# Patient Record
Sex: MALE | Race: WHITE | Employment: OTHER | URBAN - METROPOLITAN AREA
[De-identification: names, ages, dates, MRNs, and addresses within clinical notes are randomized per-mention and may not be internally consistent; named-entity substitution may affect disease eponyms.]

---

## 2017-04-04 ENCOUNTER — OFFICE VISIT (OUTPATIENT)
Dept: INTERNAL MEDICINE CLINIC | Facility: CLINIC | Age: 62
End: 2017-04-04

## 2017-04-04 VITALS
SYSTOLIC BLOOD PRESSURE: 150 MMHG | WEIGHT: 295 LBS | RESPIRATION RATE: 14 BRPM | BODY MASS INDEX: 40 KG/M2 | HEART RATE: 87 BPM | TEMPERATURE: 98 F | OXYGEN SATURATION: 98 % | DIASTOLIC BLOOD PRESSURE: 92 MMHG

## 2017-04-04 DIAGNOSIS — R73.9 ELEVATED BLOOD SUGAR: ICD-10-CM

## 2017-04-04 DIAGNOSIS — R03.0 ELEVATED BLOOD PRESSURE READING: Primary | ICD-10-CM

## 2017-04-04 DIAGNOSIS — E66.3 OVERWEIGHT(278.02): ICD-10-CM

## 2017-04-04 PROCEDURE — 99214 OFFICE O/P EST MOD 30 MIN: CPT | Performed by: INTERNAL MEDICINE

## 2017-04-04 PROCEDURE — 99212 OFFICE O/P EST SF 10 MIN: CPT | Performed by: INTERNAL MEDICINE

## 2017-04-04 NOTE — PROGRESS NOTES
Dominguez Christian is a 58year old male   HPI:   Pt.presents for the following problems. Patient feels well. No acute complaints. Discussed weight. Patient having trouble losing weight. Encouraged weight loss. Increasing activity.   I did discuss opt sore throat  LUNGS:  denies shortness of breath or cough  CARDIOVASCULAR :  denies chest pain or palpitations  GI:  denies abdominal pain, blood in stool or changes in bowel movements.   : denies blood in urine or changes in stream  NEURO:  denies headach

## 2017-05-11 ENCOUNTER — TELEPHONE (OUTPATIENT)
Dept: INTERNAL MEDICINE CLINIC | Facility: CLINIC | Age: 62
End: 2017-05-11

## 2017-05-11 NOTE — TELEPHONE ENCOUNTER
Pt is filling forms for life insurance. He needs his cholesterol readings -total cholesterol, cholesterol profile, HDL , total HDL ratio. (he was reading off of the form).     Tasked to [place]

## 2017-06-29 ENCOUNTER — APPOINTMENT (OUTPATIENT)
Dept: LAB | Age: 62
End: 2017-06-29
Attending: INTERNAL MEDICINE
Payer: COMMERCIAL

## 2017-06-29 DIAGNOSIS — R03.0 ELEVATED BLOOD PRESSURE READING: ICD-10-CM

## 2017-06-29 DIAGNOSIS — R73.9 ELEVATED BLOOD SUGAR: ICD-10-CM

## 2017-06-29 DIAGNOSIS — E66.3 OVERWEIGHT: ICD-10-CM

## 2017-06-29 LAB
ALBUMIN SERPL BCP-MCNC: 3.7 G/DL (ref 3.5–4.8)
ALBUMIN/GLOB SERPL: 1.2 {RATIO} (ref 1–2)
ALP SERPL-CCNC: 44 U/L (ref 32–100)
ALT SERPL-CCNC: 20 U/L (ref 17–63)
ANION GAP SERPL CALC-SCNC: 10 MMOL/L (ref 0–18)
AST SERPL-CCNC: 12 U/L (ref 15–41)
BILIRUB SERPL-MCNC: 1.3 MG/DL (ref 0.3–1.2)
BILIRUB UR QL: NEGATIVE
BUN SERPL-MCNC: 26 MG/DL (ref 8–20)
BUN/CREAT SERPL: 21 (ref 10–20)
CALCIUM SERPL-MCNC: 8.7 MG/DL (ref 8.5–10.5)
CHLORIDE SERPL-SCNC: 106 MMOL/L (ref 95–110)
CHOLEST SERPL-MCNC: 168 MG/DL (ref 110–200)
CLARITY UR: CLEAR
CO2 SERPL-SCNC: 24 MMOL/L (ref 22–32)
COLOR UR: YELLOW
CREAT SERPL-MCNC: 1.24 MG/DL (ref 0.5–1.5)
GLOBULIN PLAS-MCNC: 3 G/DL (ref 2.5–3.7)
GLUCOSE SERPL-MCNC: 104 MG/DL (ref 70–99)
GLUCOSE UR-MCNC: NEGATIVE MG/DL
HBA1C MFR BLD: 5.9 % (ref 4–6)
HDLC SERPL-MCNC: 44 MG/DL
HGB UR QL STRIP.AUTO: NEGATIVE
KETONES UR-MCNC: NEGATIVE MG/DL
LDLC SERPL CALC-MCNC: 115 MG/DL (ref 0–99)
LEUKOCYTE ESTERASE UR QL STRIP.AUTO: NEGATIVE
NITRITE UR QL STRIP.AUTO: NEGATIVE
NONHDLC SERPL-MCNC: 124 MG/DL
OSMOLALITY UR CALC.SUM OF ELEC: 295 MOSM/KG (ref 275–295)
PH UR: 5 [PH] (ref 5–8)
POTASSIUM SERPL-SCNC: 4.1 MMOL/L (ref 3.3–5.1)
PROT SERPL-MCNC: 6.7 G/DL (ref 5.9–8.4)
PROT UR-MCNC: NEGATIVE MG/DL
SODIUM SERPL-SCNC: 140 MMOL/L (ref 136–144)
SP GR UR STRIP: 1.02 (ref 1–1.03)
TRIGL SERPL-MCNC: 46 MG/DL (ref 1–149)
UROBILINOGEN UR STRIP-ACNC: <2
VIT C UR-MCNC: NEGATIVE MG/DL

## 2017-06-29 PROCEDURE — 80053 COMPREHEN METABOLIC PANEL: CPT

## 2017-06-29 PROCEDURE — 80061 LIPID PANEL: CPT

## 2017-06-29 PROCEDURE — 83036 HEMOGLOBIN GLYCOSYLATED A1C: CPT

## 2017-06-29 PROCEDURE — 36415 COLL VENOUS BLD VENIPUNCTURE: CPT

## 2017-06-29 PROCEDURE — 81003 URINALYSIS AUTO W/O SCOPE: CPT

## 2017-07-13 ENCOUNTER — OFFICE VISIT (OUTPATIENT)
Dept: INTERNAL MEDICINE CLINIC | Facility: CLINIC | Age: 62
End: 2017-07-13

## 2017-07-13 VITALS
WEIGHT: 286 LBS | TEMPERATURE: 98 F | DIASTOLIC BLOOD PRESSURE: 86 MMHG | HEART RATE: 81 BPM | BODY MASS INDEX: 39 KG/M2 | SYSTOLIC BLOOD PRESSURE: 134 MMHG | OXYGEN SATURATION: 98 %

## 2017-07-13 DIAGNOSIS — R73.9 ELEVATED BLOOD SUGAR: ICD-10-CM

## 2017-07-13 DIAGNOSIS — E66.3 OVERWEIGHT: ICD-10-CM

## 2017-07-13 DIAGNOSIS — R03.0 ELEVATED BLOOD PRESSURE READING: Primary | ICD-10-CM

## 2017-07-13 DIAGNOSIS — Z00.00 ROUTINE HEALTH MAINTENANCE: ICD-10-CM

## 2017-07-13 PROCEDURE — 99212 OFFICE O/P EST SF 10 MIN: CPT | Performed by: INTERNAL MEDICINE

## 2017-07-13 PROCEDURE — 99214 OFFICE O/P EST MOD 30 MIN: CPT | Performed by: INTERNAL MEDICINE

## 2017-07-13 NOTE — PROGRESS NOTES
Sandra Billingsley is a 58year old male   HPI:   Pt.presents for the following problems. Patient here for blood pressure follow-up. Blood pressure much better. Patient did lose weight. He has cut out carbohydrates.   Weight April 4 was 295 pounds and now blurred vision or eye pain  HEENT: denies nasal congestion, sinus pain or sore throat  LUNGS:  denies shortness of breath or cough  CARDIOVASCULAR :  denies chest pain or palpitations  GI:  denies abdominal pain, blood in stool or changes in bowel movement

## 2017-10-12 ENCOUNTER — OFFICE VISIT (OUTPATIENT)
Dept: INTERNAL MEDICINE CLINIC | Facility: CLINIC | Age: 62
End: 2017-10-12

## 2017-10-12 VITALS
SYSTOLIC BLOOD PRESSURE: 130 MMHG | HEIGHT: 72 IN | WEIGHT: 275.81 LBS | TEMPERATURE: 98 F | BODY MASS INDEX: 37.36 KG/M2 | HEART RATE: 76 BPM | DIASTOLIC BLOOD PRESSURE: 78 MMHG | OXYGEN SATURATION: 98 %

## 2017-10-12 DIAGNOSIS — E66.3 OVERWEIGHT: ICD-10-CM

## 2017-10-12 DIAGNOSIS — Z12.11 COLON CANCER SCREENING: ICD-10-CM

## 2017-10-12 DIAGNOSIS — R73.9 ELEVATED BLOOD SUGAR: ICD-10-CM

## 2017-10-12 DIAGNOSIS — R03.0 ELEVATED BLOOD PRESSURE READING: Primary | ICD-10-CM

## 2017-10-12 PROCEDURE — 99213 OFFICE O/P EST LOW 20 MIN: CPT | Performed by: INTERNAL MEDICINE

## 2017-10-12 PROCEDURE — 90686 IIV4 VACC NO PRSV 0.5 ML IM: CPT | Performed by: INTERNAL MEDICINE

## 2017-10-12 PROCEDURE — 90471 IMMUNIZATION ADMIN: CPT | Performed by: INTERNAL MEDICINE

## 2017-10-12 PROCEDURE — 99212 OFFICE O/P EST SF 10 MIN: CPT | Performed by: INTERNAL MEDICINE

## 2017-10-12 NOTE — PROGRESS NOTES
Irena Talbert is a 58year old male   HPI:   Pt.presents for the following problems. Patient feels well. Has no complaints. He has lost a nice amount of weight. His current weight is 275. In April he was 295. He has cut out breads. Pastries.   Sna stream  MUSCULOSKELETAL:  denies back pain or joint pain  NEURO:  denies headaches or dizzyness  PSYCHE:  denies depression or anxiety    EXAM:   /78 (BP Location: Right arm, Patient Position: Sitting, Cuff Size: large)   Pulse 76   Temp 98.2 °F (36.

## 2018-01-08 ENCOUNTER — APPOINTMENT (OUTPATIENT)
Dept: LAB | Age: 63
End: 2018-01-08
Attending: INTERNAL MEDICINE
Payer: COMMERCIAL

## 2018-01-08 DIAGNOSIS — R73.9 ELEVATED BLOOD SUGAR: ICD-10-CM

## 2018-01-08 LAB
ALBUMIN SERPL BCP-MCNC: 3.8 G/DL (ref 3.5–4.8)
ALBUMIN/GLOB SERPL: 1.3 {RATIO} (ref 1–2)
ALP SERPL-CCNC: 42 U/L (ref 32–100)
ALT SERPL-CCNC: 19 U/L (ref 17–63)
ANION GAP SERPL CALC-SCNC: 6 MMOL/L (ref 0–18)
AST SERPL-CCNC: 13 U/L (ref 15–41)
BILIRUB SERPL-MCNC: 0.9 MG/DL (ref 0.3–1.2)
BUN SERPL-MCNC: 18 MG/DL (ref 8–20)
BUN/CREAT SERPL: 18.8 (ref 10–20)
CALCIUM SERPL-MCNC: 8.9 MG/DL (ref 8.5–10.5)
CHLORIDE SERPL-SCNC: 106 MMOL/L (ref 95–110)
CHOLEST SERPL-MCNC: 185 MG/DL (ref 110–200)
CO2 SERPL-SCNC: 27 MMOL/L (ref 22–32)
CREAT SERPL-MCNC: 0.96 MG/DL (ref 0.5–1.5)
GLOBULIN PLAS-MCNC: 2.9 G/DL (ref 2.5–3.7)
GLUCOSE SERPL-MCNC: 106 MG/DL (ref 70–99)
HDLC SERPL-MCNC: 50 MG/DL
LDLC SERPL CALC-MCNC: 125 MG/DL (ref 0–99)
NONHDLC SERPL-MCNC: 135 MG/DL
OSMOLALITY UR CALC.SUM OF ELEC: 290 MOSM/KG (ref 275–295)
POTASSIUM SERPL-SCNC: 4.3 MMOL/L (ref 3.3–5.1)
PROT SERPL-MCNC: 6.7 G/DL (ref 5.9–8.4)
PSA SERPL-MCNC: 2.6 NG/ML (ref 0–4)
SODIUM SERPL-SCNC: 139 MMOL/L (ref 136–144)
TRIGL SERPL-MCNC: 49 MG/DL (ref 1–149)

## 2018-01-08 PROCEDURE — 36415 COLL VENOUS BLD VENIPUNCTURE: CPT

## 2018-01-08 PROCEDURE — 80053 COMPREHEN METABOLIC PANEL: CPT

## 2018-01-08 PROCEDURE — 80061 LIPID PANEL: CPT

## 2018-01-11 ENCOUNTER — OFFICE VISIT (OUTPATIENT)
Dept: INTERNAL MEDICINE CLINIC | Facility: CLINIC | Age: 63
End: 2018-01-11

## 2018-01-11 VITALS
SYSTOLIC BLOOD PRESSURE: 130 MMHG | WEIGHT: 271 LBS | DIASTOLIC BLOOD PRESSURE: 78 MMHG | TEMPERATURE: 99 F | HEIGHT: 72 IN | HEART RATE: 72 BPM | BODY MASS INDEX: 36.7 KG/M2

## 2018-01-11 DIAGNOSIS — E66.3 OVERWEIGHT: ICD-10-CM

## 2018-01-11 DIAGNOSIS — Z12.11 COLON CANCER SCREENING: ICD-10-CM

## 2018-01-11 DIAGNOSIS — R03.0 ELEVATED BLOOD PRESSURE READING: Primary | ICD-10-CM

## 2018-01-11 DIAGNOSIS — Z00.00 ROUTINE HEALTH MAINTENANCE: ICD-10-CM

## 2018-01-11 DIAGNOSIS — R73.9 ELEVATED BLOOD SUGAR: ICD-10-CM

## 2018-01-11 PROCEDURE — 99212 OFFICE O/P EST SF 10 MIN: CPT | Performed by: INTERNAL MEDICINE

## 2018-01-11 PROCEDURE — 99214 OFFICE O/P EST MOD 30 MIN: CPT | Performed by: INTERNAL MEDICINE

## 2018-01-11 NOTE — PROGRESS NOTES
Dominguez Christian is a 58year old male   HPI:   Pt.presents for the following problems. Patient has done a very good job losing weight. In April 2017 he was 295 pounds. He is now 5. That is a 24 pound weight loss.     Blood pressure no more satisfacto History:  Smoking status: Never Smoker                                                              Smokeless tobacco: Never Used                      Alcohol use: No                    REVIEW OF SYSTEMS:   GENERAL:  feels well. No acute distress.   SKIN: maintenance  Patient did have his flu vaccine this season. Follow-up in 6 months. Repeat PSA at that time including other general labs.     Eve Terry MD  1/11/2018  5:55 PM

## 2018-07-06 ENCOUNTER — TELEPHONE (OUTPATIENT)
Dept: INTERNAL MEDICINE CLINIC | Facility: CLINIC | Age: 63
End: 2018-07-06

## 2018-07-06 DIAGNOSIS — Z11.59 ENCOUNTER FOR HEPATITIS C SCREENING TEST FOR LOW RISK PATIENT: ICD-10-CM

## 2018-07-06 DIAGNOSIS — R73.9 ELEVATED BLOOD SUGAR: Primary | ICD-10-CM

## 2018-07-10 ENCOUNTER — APPOINTMENT (OUTPATIENT)
Dept: LAB | Age: 63
End: 2018-07-10
Attending: INTERNAL MEDICINE
Payer: COMMERCIAL

## 2018-07-10 DIAGNOSIS — Z11.59 ENCOUNTER FOR HEPATITIS C SCREENING TEST FOR LOW RISK PATIENT: ICD-10-CM

## 2018-07-10 DIAGNOSIS — R73.9 ELEVATED BLOOD SUGAR: ICD-10-CM

## 2018-07-10 LAB
ANION GAP SERPL CALC-SCNC: 9 MMOL/L (ref 0–18)
BUN SERPL-MCNC: 22 MG/DL (ref 8–20)
BUN/CREAT SERPL: 23.7 (ref 10–20)
CALCIUM SERPL-MCNC: 8.6 MG/DL (ref 8.5–10.5)
CHLORIDE SERPL-SCNC: 105 MMOL/L (ref 95–110)
CHOLEST SERPL-MCNC: 175 MG/DL (ref 110–200)
CO2 SERPL-SCNC: 24 MMOL/L (ref 22–32)
CREAT SERPL-MCNC: 0.93 MG/DL (ref 0.5–1.5)
GLUCOSE SERPL-MCNC: 104 MG/DL (ref 70–99)
HBA1C MFR BLD: 5.7 % (ref 4–6)
HDLC SERPL-MCNC: 49 MG/DL
LDLC SERPL CALC-MCNC: 118 MG/DL (ref 0–99)
NONHDLC SERPL-MCNC: 126 MG/DL
OSMOLALITY UR CALC.SUM OF ELEC: 290 MOSM/KG (ref 275–295)
POTASSIUM SERPL-SCNC: 3.8 MMOL/L (ref 3.3–5.1)
SODIUM SERPL-SCNC: 138 MMOL/L (ref 136–144)
TRIGL SERPL-MCNC: 42 MG/DL (ref 1–149)

## 2018-07-10 PROCEDURE — 80048 BASIC METABOLIC PNL TOTAL CA: CPT

## 2018-07-10 PROCEDURE — 36415 COLL VENOUS BLD VENIPUNCTURE: CPT

## 2018-07-10 PROCEDURE — 86803 HEPATITIS C AB TEST: CPT

## 2018-07-10 PROCEDURE — 83036 HEMOGLOBIN GLYCOSYLATED A1C: CPT

## 2018-07-10 PROCEDURE — 80061 LIPID PANEL: CPT

## 2018-07-11 LAB — HCV AB SERPL QL IA: NONREACTIVE

## 2018-07-12 ENCOUNTER — OFFICE VISIT (OUTPATIENT)
Dept: INTERNAL MEDICINE CLINIC | Facility: CLINIC | Age: 63
End: 2018-07-12

## 2018-07-12 VITALS
HEART RATE: 80 BPM | WEIGHT: 267 LBS | BODY MASS INDEX: 36 KG/M2 | DIASTOLIC BLOOD PRESSURE: 78 MMHG | SYSTOLIC BLOOD PRESSURE: 138 MMHG | TEMPERATURE: 98 F

## 2018-07-12 DIAGNOSIS — R03.0 ELEVATED BLOOD PRESSURE READING: ICD-10-CM

## 2018-07-12 DIAGNOSIS — R73.9 ELEVATED BLOOD SUGAR: Primary | ICD-10-CM

## 2018-07-12 DIAGNOSIS — Z12.11 COLON CANCER SCREENING: ICD-10-CM

## 2018-07-12 DIAGNOSIS — Z00.00 ROUTINE HEALTH MAINTENANCE: ICD-10-CM

## 2018-07-12 PROCEDURE — 99212 OFFICE O/P EST SF 10 MIN: CPT | Performed by: INTERNAL MEDICINE

## 2018-07-12 PROCEDURE — 99214 OFFICE O/P EST MOD 30 MIN: CPT | Performed by: INTERNAL MEDICINE

## 2018-07-12 NOTE — PROGRESS NOTES
Annalise Segovia is a 61year old male   HPI:   Pt.presents for the following problems. Patient doing well. He has continued to lose a nice amount of weight. His weight currently is 267 pounds. In April 2017 he was 295 pounds.   He has cut out breads an rash  EYES:  denies blurred vision or eye pain  HEENT: denies nasal congestion, sinus pain or sore throat  LUNGS:  denies shortness of breath or cough  CARDIOVASCULAR :  denies chest pain or palpitations  GI:  denies abdominal pain, blood in stool or guerrero

## 2018-11-12 ENCOUNTER — OFFICE VISIT (OUTPATIENT)
Dept: INTERNAL MEDICINE CLINIC | Facility: CLINIC | Age: 63
End: 2018-11-12
Payer: COMMERCIAL

## 2018-11-12 VITALS
DIASTOLIC BLOOD PRESSURE: 70 MMHG | WEIGHT: 262.63 LBS | OXYGEN SATURATION: 97 % | SYSTOLIC BLOOD PRESSURE: 130 MMHG | HEIGHT: 72 IN | HEART RATE: 79 BPM | BODY MASS INDEX: 35.57 KG/M2 | TEMPERATURE: 99 F

## 2018-11-12 DIAGNOSIS — Z23 NEED FOR IMMUNIZATION AGAINST INFLUENZA: Primary | ICD-10-CM

## 2018-11-12 DIAGNOSIS — R73.9 ELEVATED BLOOD SUGAR: ICD-10-CM

## 2018-11-12 DIAGNOSIS — R97.20 RISING PSA LEVEL: ICD-10-CM

## 2018-11-12 DIAGNOSIS — R03.0 ELEVATED BLOOD PRESSURE READING: ICD-10-CM

## 2018-11-12 DIAGNOSIS — Z12.11 COLON CANCER SCREENING: ICD-10-CM

## 2018-11-12 PROCEDURE — 90471 IMMUNIZATION ADMIN: CPT | Performed by: INTERNAL MEDICINE

## 2018-11-12 PROCEDURE — 99212 OFFICE O/P EST SF 10 MIN: CPT | Performed by: INTERNAL MEDICINE

## 2018-11-12 PROCEDURE — 99214 OFFICE O/P EST MOD 30 MIN: CPT | Performed by: INTERNAL MEDICINE

## 2018-11-12 PROCEDURE — 90686 IIV4 VACC NO PRSV 0.5 ML IM: CPT | Performed by: INTERNAL MEDICINE

## 2018-11-12 NOTE — PROGRESS NOTES
Anastacia Perez is a 61year old male   HPI:   Pt.presents for the following problems. Patient feels well. He has no acute complaints. He is losing a nice amount of weight. In July 2017 he was 286 pounds and today is 262 pounds.      Patient's blood pr Voices no  blurred vision or eye pain  HEENT: Voices no nasal congestion, sinus pain or sore throat  LUNGS:  Voices no shortness of breath or cough  CARDIOVASCULAR :  Voices no chest pain or palpitations  GI:  Voices no abdominal pain, blood in stool or ch

## 2019-05-15 ENCOUNTER — TELEPHONE (OUTPATIENT)
Dept: INTERNAL MEDICINE CLINIC | Facility: CLINIC | Age: 64
End: 2019-05-15

## 2019-05-15 NOTE — TELEPHONE ENCOUNTER
Pt did not have labs done that were ordered after last OV w/Dr Aftab Gagnon on May 20  Should pt have labs drawn with orders in system or does Dr Corrales Apt want anything else    Please advise [phone]

## 2019-05-23 ENCOUNTER — LAB ENCOUNTER (OUTPATIENT)
Dept: LAB | Age: 64
End: 2019-05-23
Attending: INTERNAL MEDICINE
Payer: COMMERCIAL

## 2019-05-23 DIAGNOSIS — R03.0 ELEVATED BLOOD PRESSURE READING: ICD-10-CM

## 2019-05-23 DIAGNOSIS — R73.9 ELEVATED BLOOD SUGAR: ICD-10-CM

## 2019-05-23 DIAGNOSIS — R97.20 RISING PSA LEVEL: ICD-10-CM

## 2019-05-23 PROCEDURE — 84153 ASSAY OF PSA TOTAL: CPT

## 2019-05-23 PROCEDURE — 80061 LIPID PANEL: CPT

## 2019-05-23 PROCEDURE — 36415 COLL VENOUS BLD VENIPUNCTURE: CPT

## 2019-05-23 PROCEDURE — 80053 COMPREHEN METABOLIC PANEL: CPT

## 2019-05-23 PROCEDURE — 85025 COMPLETE CBC W/AUTO DIFF WBC: CPT

## 2019-05-28 ENCOUNTER — OFFICE VISIT (OUTPATIENT)
Dept: INTERNAL MEDICINE CLINIC | Facility: CLINIC | Age: 64
End: 2019-05-28
Payer: COMMERCIAL

## 2019-05-28 VITALS
HEART RATE: 80 BPM | SYSTOLIC BLOOD PRESSURE: 142 MMHG | HEIGHT: 72 IN | BODY MASS INDEX: 35.76 KG/M2 | TEMPERATURE: 98 F | WEIGHT: 264 LBS | DIASTOLIC BLOOD PRESSURE: 84 MMHG | OXYGEN SATURATION: 98 %

## 2019-05-28 DIAGNOSIS — Z00.00 ROUTINE HEALTH MAINTENANCE: ICD-10-CM

## 2019-05-28 DIAGNOSIS — R03.0 ELEVATED BLOOD PRESSURE READING: Primary | ICD-10-CM

## 2019-05-28 DIAGNOSIS — Z12.11 COLON CANCER SCREENING: ICD-10-CM

## 2019-05-28 PROCEDURE — 99212 OFFICE O/P EST SF 10 MIN: CPT | Performed by: INTERNAL MEDICINE

## 2019-05-28 PROCEDURE — 99214 OFFICE O/P EST MOD 30 MIN: CPT | Performed by: INTERNAL MEDICINE

## 2019-05-28 PROCEDURE — 93005 ELECTROCARDIOGRAM TRACING: CPT | Performed by: INTERNAL MEDICINE

## 2019-05-28 PROCEDURE — 93000 ELECTROCARDIOGRAM COMPLETE: CPT | Performed by: INTERNAL MEDICINE

## 2019-05-28 NOTE — PROGRESS NOTES
Britton Hashimoto is a 59year old male   HPI:   Pt.presents for the following problems. He feels well. He has no complaints. We did talk about his blood pressure. Slightly elevated. We did talk about low-salt diet.   Patient did have some extra salt Media Tab – 09-   • HAND/FINGER SURGERY UNLISTED      as per NG      Family History   Problem Relation Age of Onset   • Heart Disease Father         CAD, as per NG   • Heart Attack Father 68        Cause of death; as per NG   • Lipids Father Dr. Sarahy Reeves. EXTREMITIES:  no cyanosis, clubbing or edema. NEURO:  Awake and aware. ASSESSMENT AND PLAN:   1. Elevated blood pressure reading  Elevated blood pressure reading.   I did recheck his blood pressure and he was 136/80 on the right and 142/

## 2019-11-19 ENCOUNTER — OFFICE VISIT (OUTPATIENT)
Dept: INTERNAL MEDICINE CLINIC | Facility: CLINIC | Age: 64
End: 2019-11-19
Payer: COMMERCIAL

## 2019-11-19 VITALS
OXYGEN SATURATION: 98 % | WEIGHT: 263 LBS | HEART RATE: 76 BPM | HEIGHT: 72 IN | BODY MASS INDEX: 35.62 KG/M2 | SYSTOLIC BLOOD PRESSURE: 132 MMHG | DIASTOLIC BLOOD PRESSURE: 84 MMHG | TEMPERATURE: 98 F

## 2019-11-19 DIAGNOSIS — Z23 NEED FOR INFLUENZA VACCINATION: ICD-10-CM

## 2019-11-19 DIAGNOSIS — Z00.00 ROUTINE HEALTH MAINTENANCE: ICD-10-CM

## 2019-11-19 DIAGNOSIS — R73.9 ELEVATED BLOOD SUGAR: ICD-10-CM

## 2019-11-19 DIAGNOSIS — R03.0 ELEVATED BLOOD PRESSURE READING: Primary | ICD-10-CM

## 2019-11-19 PROCEDURE — 90686 IIV4 VACC NO PRSV 0.5 ML IM: CPT | Performed by: INTERNAL MEDICINE

## 2019-11-19 PROCEDURE — 99214 OFFICE O/P EST MOD 30 MIN: CPT | Performed by: INTERNAL MEDICINE

## 2019-11-19 PROCEDURE — 90471 IMMUNIZATION ADMIN: CPT | Performed by: INTERNAL MEDICINE

## 2019-11-19 NOTE — PROGRESS NOTES
Jluis Kemp is a 59year old male. HPI:   Patient presents with:  Checkup: 6 month checkup. Pt would like flu shot today. Patient feels well. We talked about diet. He does a lot of activity at work. He is trying to keep his carbs down.   Weight s adenopathy,  thyroid normal  LUNGS:  clear to auscultation. Effort normal  CARDIO:  RRR without murmur. S1 and S2 normal  GI:  good BS's,  no masses,   HSM or tenderness  EXTREMITIES : no cyanosis, clubbing or edema    ASSESSMENT AND PLAN:     1.  Michelle Doctor

## 2020-03-11 ENCOUNTER — OFFICE VISIT (OUTPATIENT)
Dept: INTERNAL MEDICINE CLINIC | Facility: CLINIC | Age: 65
End: 2020-03-11
Payer: MEDICARE

## 2020-03-11 VITALS
TEMPERATURE: 99 F | SYSTOLIC BLOOD PRESSURE: 112 MMHG | DIASTOLIC BLOOD PRESSURE: 76 MMHG | OXYGEN SATURATION: 96 % | HEART RATE: 96 BPM | HEIGHT: 72 IN | BODY MASS INDEX: 35.89 KG/M2 | WEIGHT: 265 LBS

## 2020-03-11 DIAGNOSIS — H61.23 IMPACTED CERUMEN OF BOTH EARS: ICD-10-CM

## 2020-03-11 DIAGNOSIS — H66.92 LEFT OTITIS MEDIA, UNSPECIFIED OTITIS MEDIA TYPE: ICD-10-CM

## 2020-03-11 DIAGNOSIS — H10.33 ACUTE BACTERIAL CONJUNCTIVITIS OF BOTH EYES: Primary | ICD-10-CM

## 2020-03-11 PROCEDURE — 99214 OFFICE O/P EST MOD 30 MIN: CPT | Performed by: INTERNAL MEDICINE

## 2020-03-11 RX ORDER — AMOXICILLIN 875 MG/1
875 TABLET, COATED ORAL 2 TIMES DAILY
Qty: 20 TABLET | Refills: 0 | Status: SHIPPED | OUTPATIENT
Start: 2020-03-11 | End: 2020-05-12

## 2020-03-11 RX ORDER — OFLOXACIN 3 MG/ML
SOLUTION/ DROPS OPHTHALMIC
Qty: 2 BOTTLE | Refills: 0 | Status: SHIPPED | OUTPATIENT
Start: 2020-03-11 | End: 2020-05-12

## 2020-03-11 NOTE — PROGRESS NOTES
Irena Talbert is a 72year old male. Patient presents with:  Cough: Pt noted sore throat on Friday, pt developed dry cough. Pt c/o body chills and general malaise, denies body aches. Denies SOB or wheezing.  Pt also has L. ear pain, and eyes are red with ye both eyes  Ocuflox 0.3% ophth soln x 7 days    2. Left otitis media, unspecified otitis media type  Amoxicillin 875mg BID x 10 days    3. Impacted cerumen of both ears  Copious amount of cerumen flushed from both ear canals.   R TM normal.  L TM red and sli

## 2020-05-06 ENCOUNTER — APPOINTMENT (OUTPATIENT)
Dept: LAB | Age: 65
End: 2020-05-06
Attending: INTERNAL MEDICINE
Payer: MEDICARE

## 2020-05-06 DIAGNOSIS — R03.0 ELEVATED BLOOD PRESSURE READING: ICD-10-CM

## 2020-05-06 DIAGNOSIS — R73.9 ELEVATED BLOOD SUGAR: ICD-10-CM

## 2020-05-06 PROCEDURE — 80061 LIPID PANEL: CPT

## 2020-05-06 PROCEDURE — 36415 COLL VENOUS BLD VENIPUNCTURE: CPT

## 2020-05-06 PROCEDURE — 80048 BASIC METABOLIC PNL TOTAL CA: CPT

## 2020-05-12 ENCOUNTER — OFFICE VISIT (OUTPATIENT)
Dept: INTERNAL MEDICINE CLINIC | Facility: CLINIC | Age: 65
End: 2020-05-12
Payer: MEDICARE

## 2020-05-12 VITALS
HEART RATE: 76 BPM | DIASTOLIC BLOOD PRESSURE: 80 MMHG | BODY MASS INDEX: 35.71 KG/M2 | SYSTOLIC BLOOD PRESSURE: 130 MMHG | WEIGHT: 263.63 LBS | TEMPERATURE: 98 F | OXYGEN SATURATION: 98 % | HEIGHT: 72 IN

## 2020-05-12 DIAGNOSIS — R03.0 ELEVATED BLOOD PRESSURE READING: Primary | ICD-10-CM

## 2020-05-12 DIAGNOSIS — Z91.89 CARDIOVASCULAR RISK FACTOR: ICD-10-CM

## 2020-05-12 DIAGNOSIS — Z12.11 COLON CANCER SCREENING: ICD-10-CM

## 2020-05-12 DIAGNOSIS — R79.9 ELEVATED BUN: ICD-10-CM

## 2020-05-12 PROCEDURE — 99214 OFFICE O/P EST MOD 30 MIN: CPT | Performed by: INTERNAL MEDICINE

## 2020-05-12 NOTE — PROGRESS NOTES
Jensen Burrows is a 72year old male. HPI:   Patient presents with:  Checkup: 6 month checkup     Patient feels well. He has no particular problems. We did talk about mildly elevated CV risk of around 11% or so. We did talk about starting statin.   In (119.6 kg)   SpO2 98%   BMI 35.75 kg/m²     GENERAL:  well developed, well nourished, in no apparent distress  SKIN:  no rashes , no suspicious lesions  HEENT: atraumatic. Pharynx normal without exudate. EYES:  PERRL. Sclera anicteric.   NECK:  Supple,

## 2020-11-16 ENCOUNTER — HOSPITAL ENCOUNTER (OUTPATIENT)
Age: 65
Discharge: HOME | End: 2020-11-16
Payer: MEDICARE

## 2020-11-16 VITALS
HEIGHT: 72 IN | OXYGEN SATURATION: 100 % | RESPIRATION RATE: 16 BRPM | BODY MASS INDEX: 33.86 KG/M2 | DIASTOLIC BLOOD PRESSURE: 88 MMHG | SYSTOLIC BLOOD PRESSURE: 142 MMHG | TEMPERATURE: 97 F | WEIGHT: 250 LBS | HEART RATE: 76 BPM

## 2020-11-16 DIAGNOSIS — Z20.822 ENCOUNTER FOR LABORATORY TESTING FOR COVID-19 VIRUS: Primary | ICD-10-CM

## 2020-11-16 PROCEDURE — 99213 OFFICE O/P EST LOW 20 MIN: CPT | Performed by: PHYSICIAN ASSISTANT

## 2020-11-16 NOTE — ED PROVIDER NOTES
Patient Seen in: [facility]      History   Patient presents with:  Testing    Stated Complaint: EXPOSED    HPI    42-year-old male here for Covid testing. Patient attended a week 1 week ago. He is asymptomatic and offers no complaints.     P Rhythm: Normal rate. Pulmonary:      Effort: Pulmonary effort is normal.   Abdominal:      General: Abdomen is flat. Musculoskeletal: Normal range of motion. Skin:     General: Skin is warm. Neurological:      General: No focal deficit present.

## 2021-03-09 DIAGNOSIS — Z23 NEED FOR VACCINATION: ICD-10-CM

## 2021-08-16 ENCOUNTER — LAB ENCOUNTER (OUTPATIENT)
Dept: LAB | Facility: HOSPITAL | Age: 66
End: 2021-08-16
Attending: INTERNAL MEDICINE
Payer: MEDICARE

## 2021-08-16 ENCOUNTER — TELEPHONE (OUTPATIENT)
Dept: INTERNAL MEDICINE CLINIC | Facility: CLINIC | Age: 66
End: 2021-08-16

## 2021-08-16 DIAGNOSIS — Z20.822 EXPOSURE TO COVID-19 VIRUS: ICD-10-CM

## 2021-08-16 DIAGNOSIS — Z20.822 EXPOSURE TO COVID-19 VIRUS: Primary | ICD-10-CM

## 2021-08-17 LAB — SARS-COV-2 RNA RESP QL NAA+PROBE: NOT DETECTED

## 2023-02-02 ENCOUNTER — TELEPHONE (OUTPATIENT)
Dept: INTERNAL MEDICINE CLINIC | Facility: CLINIC | Age: 68
End: 2023-02-02

## 2023-02-02 DIAGNOSIS — E78.5 HYPERLIPIDEMIA, UNSPECIFIED HYPERLIPIDEMIA TYPE: Primary | ICD-10-CM

## 2023-02-02 DIAGNOSIS — Z12.5 PROSTATE CANCER SCREENING: ICD-10-CM

## 2023-02-02 NOTE — TELEPHONE ENCOUNTER
Patient's wife Jared Melchor is calling patient is scheduled on 2/20 for a Medicare Annual  Requesting an order for blood work to be entered in the system    Please call Jared Roche when order is completed  Phone [phone], okay to leave voicemail

## 2023-02-09 ENCOUNTER — LAB ENCOUNTER (OUTPATIENT)
Dept: LAB | Age: 68
End: 2023-02-09
Attending: INTERNAL MEDICINE
Payer: MEDICARE

## 2023-02-09 ENCOUNTER — TELEPHONE (OUTPATIENT)
Dept: INTERNAL MEDICINE CLINIC | Facility: CLINIC | Age: 68
End: 2023-02-09

## 2023-02-09 DIAGNOSIS — Z12.5 PROSTATE CANCER SCREENING: ICD-10-CM

## 2023-02-09 DIAGNOSIS — E78.5 HYPERLIPIDEMIA, UNSPECIFIED HYPERLIPIDEMIA TYPE: ICD-10-CM

## 2023-02-09 LAB
ALBUMIN SERPL-MCNC: 3.6 G/DL (ref 3.4–5)
ALBUMIN/GLOB SERPL: 1.1 {RATIO} (ref 1–2)
ALP LIVER SERPL-CCNC: 57 U/L
ALT SERPL-CCNC: 23 U/L
ANION GAP SERPL CALC-SCNC: 6 MMOL/L (ref 0–18)
AST SERPL-CCNC: 12 U/L (ref 15–37)
BASOPHILS # BLD AUTO: 0.09 X10(3) UL (ref 0–0.2)
BASOPHILS NFR BLD AUTO: 1 %
BILIRUB SERPL-MCNC: 0.8 MG/DL (ref 0.1–2)
BUN BLD-MCNC: 30 MG/DL (ref 7–18)
BUN/CREAT SERPL: 27.3 (ref 10–20)
CALCIUM BLD-MCNC: 8.9 MG/DL (ref 8.5–10.1)
CHLORIDE SERPL-SCNC: 107 MMOL/L (ref 98–112)
CHOLEST SERPL-MCNC: 174 MG/DL (ref ?–200)
CO2 SERPL-SCNC: 26 MMOL/L (ref 21–32)
COMPLEXED PSA SERPL-MCNC: 4.65 NG/ML (ref ?–4)
CREAT BLD-MCNC: 1.1 MG/DL
DEPRECATED RDW RBC AUTO: 44.9 FL (ref 35.1–46.3)
EOSINOPHIL # BLD AUTO: 0.43 X10(3) UL (ref 0–0.7)
EOSINOPHIL NFR BLD AUTO: 4.9 %
ERYTHROCYTE [DISTWIDTH] IN BLOOD BY AUTOMATED COUNT: 12.7 % (ref 11–15)
FASTING PATIENT LIPID ANSWER: YES
FASTING STATUS PATIENT QL REPORTED: YES
GFR SERPLBLD BASED ON 1.73 SQ M-ARVRAT: 73 ML/MIN/1.73M2 (ref 60–?)
GLOBULIN PLAS-MCNC: 3.3 G/DL (ref 2.8–4.4)
GLUCOSE BLD-MCNC: 108 MG/DL (ref 70–99)
HCT VFR BLD AUTO: 44.5 %
HDLC SERPL-MCNC: 55 MG/DL (ref 40–59)
HGB BLD-MCNC: 14.7 G/DL
IMM GRANULOCYTES # BLD AUTO: 0.02 X10(3) UL (ref 0–1)
IMM GRANULOCYTES NFR BLD: 0.2 %
LDLC SERPL CALC-MCNC: 109 MG/DL (ref ?–100)
LYMPHOCYTES # BLD AUTO: 2.02 X10(3) UL (ref 1–4)
LYMPHOCYTES NFR BLD AUTO: 22.8 %
MCH RBC QN AUTO: 31.4 PG (ref 26–34)
MCHC RBC AUTO-ENTMCNC: 33 G/DL (ref 31–37)
MCV RBC AUTO: 95.1 FL
MONOCYTES # BLD AUTO: 0.83 X10(3) UL (ref 0.1–1)
MONOCYTES NFR BLD AUTO: 9.4 %
NEUTROPHILS # BLD AUTO: 5.46 X10 (3) UL (ref 1.5–7.7)
NEUTROPHILS # BLD AUTO: 5.46 X10(3) UL (ref 1.5–7.7)
NEUTROPHILS NFR BLD AUTO: 61.7 %
NONHDLC SERPL-MCNC: 119 MG/DL (ref ?–130)
OSMOLALITY SERPL CALC.SUM OF ELEC: 295 MOSM/KG (ref 275–295)
PLATELET # BLD AUTO: 235 10(3)UL (ref 150–450)
POTASSIUM SERPL-SCNC: 4.3 MMOL/L (ref 3.5–5.1)
PROT SERPL-MCNC: 6.9 G/DL (ref 6.4–8.2)
RBC # BLD AUTO: 4.68 X10(6)UL
SODIUM SERPL-SCNC: 139 MMOL/L (ref 136–145)
TRIGL SERPL-MCNC: 52 MG/DL (ref 30–149)
TSI SER-ACNC: 1.09 MIU/ML (ref 0.36–3.74)
VLDLC SERPL CALC-MCNC: 9 MG/DL (ref 0–30)
WBC # BLD AUTO: 8.9 X10(3) UL (ref 4–11)

## 2023-02-09 PROCEDURE — 85025 COMPLETE CBC W/AUTO DIFF WBC: CPT

## 2023-02-09 PROCEDURE — 80061 LIPID PANEL: CPT

## 2023-02-09 PROCEDURE — 36415 COLL VENOUS BLD VENIPUNCTURE: CPT

## 2023-02-09 PROCEDURE — 80053 COMPREHEN METABOLIC PANEL: CPT

## 2023-02-09 PROCEDURE — 84443 ASSAY THYROID STIM HORMONE: CPT

## 2023-02-09 NOTE — TELEPHONE ENCOUNTER
Spoke with patient relayed physician message below. Patient verbalized understanding.  Provided the contanct information for Dr. Shawn Corral and [facility]

## 2023-02-09 NOTE — TELEPHONE ENCOUNTER
Please let Raymond Alba know that his lab results came out fairly acceptable. His PSA increased above his past readings so I would like him to see a urologist.  Please give him contact information to Dr. Markel Rodriguez and Saman Schneider. ( both partners )    I will be seeing him February 20 but I thought I would give him this information so he could get going on make an appointment. It may take 4 to 6 weeks to get an appointment and I think this is fine. There is no immediate urgency. PSAs can fluctuate and I can explain more when I see him.

## 2023-02-20 ENCOUNTER — OFFICE VISIT (OUTPATIENT)
Dept: INTERNAL MEDICINE CLINIC | Facility: CLINIC | Age: 68
End: 2023-02-20

## 2023-02-20 VITALS
HEIGHT: 72 IN | OXYGEN SATURATION: 96 % | BODY MASS INDEX: 40.63 KG/M2 | HEART RATE: 83 BPM | SYSTOLIC BLOOD PRESSURE: 166 MMHG | TEMPERATURE: 98 F | WEIGHT: 300 LBS | DIASTOLIC BLOOD PRESSURE: 90 MMHG

## 2023-02-20 DIAGNOSIS — R03.0 ELEVATED BLOOD PRESSURE READING: ICD-10-CM

## 2023-02-20 DIAGNOSIS — Z12.11 COLON CANCER SCREENING: ICD-10-CM

## 2023-02-20 DIAGNOSIS — M25.571 CHRONIC PAIN OF RIGHT ANKLE: ICD-10-CM

## 2023-02-20 DIAGNOSIS — M79.644 FINGER PAIN, RIGHT: ICD-10-CM

## 2023-02-20 DIAGNOSIS — Z00.00 ROUTINE HEALTH MAINTENANCE: ICD-10-CM

## 2023-02-20 DIAGNOSIS — R63.5 WEIGHT GAIN: ICD-10-CM

## 2023-02-20 DIAGNOSIS — G89.29 CHRONIC PAIN OF RIGHT ANKLE: ICD-10-CM

## 2023-02-20 DIAGNOSIS — E78.5 HYPERLIPIDEMIA, UNSPECIFIED HYPERLIPIDEMIA TYPE: ICD-10-CM

## 2023-02-20 DIAGNOSIS — R97.20 ELEVATED PSA: ICD-10-CM

## 2023-02-20 DIAGNOSIS — Z00.00 MEDICARE ANNUAL WELLNESS VISIT, INITIAL: Primary | ICD-10-CM

## 2023-02-20 PROBLEM — M20.001: Status: ACTIVE | Noted: 2023-02-20

## 2023-03-10 ENCOUNTER — NURSE ONLY (OUTPATIENT)
Dept: INTERNAL MEDICINE CLINIC | Facility: CLINIC | Age: 68
End: 2023-03-10

## 2023-03-10 VITALS — HEART RATE: 70 BPM | DIASTOLIC BLOOD PRESSURE: 86 MMHG | SYSTOLIC BLOOD PRESSURE: 148 MMHG

## 2023-03-10 DIAGNOSIS — R03.0 ELEVATED BLOOD PRESSURE READING: Primary | ICD-10-CM

## 2023-03-10 PROCEDURE — 3079F DIAST BP 80-89 MM HG: CPT

## 2023-03-10 PROCEDURE — 3077F SYST BP >= 140 MM HG: CPT

## 2023-03-10 NOTE — PROGRESS NOTES
Keegan Chairez is a 76year old male who has an elevated blood pressure reading at visit today. 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 Josef Genao He denies chest pain, dizziness, dyspnea, edema and headaches. He is not exercising and does restrict his sodium intake. He has been cutting out snacks and no salting extra. No alcohol     BP  Manual  164 / 98  Lt 148 / 86  Rt   Machine 155 / 98  151 / 82       BP Readings from Last 3 Encounters:  03/10/23 : 148/86  02/20/23 : (!) 166/90  11/16/20 : 142/88     There is no height or weight on file to calculate BMI. ASSESSMENT:   See encounter diagnosis  Discussion: Stage 1 (systolic 725 to 367 mmHg or diastolic 90 to 99 mmHg)  Cardiovascular risk factors: advanced age (older than 54 for men, 72 for women) and male gender    PLAN:   Dietary Sodium Restriction  Increased Physical Activity/ Get Regular Aerobic Exercise. Weight loss. DASH Eating Plan    Patient Education: Reviewed risks of hypertension and principles of treatment. Pt opts for TLC for 3-6 months (exercise, wt loss, DASH) and is motivated.     Pt will check BP three times per week and send numbers in 4-6 months;  His machine is acceptable (recommended rt arm)      Lavon Dsouza, PharmD, 3/10/2023, 9:11 AM

## 2023-04-03 ENCOUNTER — HOSPITAL ENCOUNTER (OUTPATIENT)
Dept: GENERAL RADIOLOGY | Age: 68
Discharge: HOME | End: 2023-04-03
Attending: INTERNAL MEDICINE
Payer: MEDICARE

## 2023-04-03 DIAGNOSIS — M79.644 FINGER PAIN, RIGHT: ICD-10-CM

## 2023-04-03 DIAGNOSIS — M25.571 CHRONIC PAIN OF RIGHT ANKLE: ICD-10-CM

## 2023-04-03 DIAGNOSIS — G89.29 CHRONIC PAIN OF RIGHT ANKLE: ICD-10-CM

## 2023-04-03 PROCEDURE — 73140 X-RAY EXAM OF FINGER(S): CPT | Performed by: INTERNAL MEDICINE

## 2023-04-03 PROCEDURE — 73610 X-RAY EXAM OF ANKLE: CPT | Performed by: INTERNAL MEDICINE

## 2023-04-04 ENCOUNTER — LAB ENCOUNTER (OUTPATIENT)
Dept: LAB | Age: 68
End: 2023-04-04
Attending: INTERNAL MEDICINE
Payer: MEDICARE

## 2023-04-04 ENCOUNTER — TELEPHONE (OUTPATIENT)
Dept: INTERNAL MEDICINE CLINIC | Facility: CLINIC | Age: 68
End: 2023-04-04

## 2023-04-04 DIAGNOSIS — R22.31 MASS OF RIGHT FINGER: Primary | ICD-10-CM

## 2023-04-04 DIAGNOSIS — R97.20 ELEVATED PSA: ICD-10-CM

## 2023-04-04 DIAGNOSIS — E78.5 HYPERLIPIDEMIA, UNSPECIFIED HYPERLIPIDEMIA TYPE: ICD-10-CM

## 2023-04-04 LAB
ATRIAL RATE: 67 BPM
P AXIS: 58 DEGREES
P-R INTERVAL: 158 MS
PSA SERPL-MCNC: 4.3 NG/ML (ref ?–4)
Q-T INTERVAL: 414 MS
QRS DURATION: 86 MS
QTC CALCULATION (BEZET): 437 MS
R AXIS: 34 DEGREES
T AXIS: 22 DEGREES
VENTRICULAR RATE: 67 BPM

## 2023-04-04 PROCEDURE — 84153 ASSAY OF PSA TOTAL: CPT

## 2023-04-04 PROCEDURE — 36415 COLL VENOUS BLD VENIPUNCTURE: CPT

## 2023-04-04 PROCEDURE — 93005 ELECTROCARDIOGRAM TRACING: CPT

## 2023-04-04 PROCEDURE — 93010 ELECTROCARDIOGRAM REPORT: CPT | Performed by: INTERNAL MEDICINE

## 2023-04-04 NOTE — TELEPHONE ENCOUNTER
Please please let patient know the following regarding recent test results    1. His PSA is 4.3. Little bit less than 4.65 that he had before. I still want him to keep his appoint with Dr. Naveen Norman coming up. 2.  His left ankle x-ray did show some ankle arthritic changes. For now if he is still having discomfort he could see an orthopedic physician. Or he can use some topicals such as Aspercreme with lidocaine that might help. 3.  His finger x-ray did show a soft tissue swelling of his third finger. This may be what is called a \"ganglion\". Or other \"soft tissue mass\" . The radiologist recommended an MRI to further characterize this soft tissue mass but I do not think that would be what I would order. However he should have this evaluated by a specialist.  We did talk about seeing Dr. Khari Melo. I will place referral for Dr. Khari Melo. Please ask him to get this checked by Dr. Khari Melo to make sure that nothing we need to be concerned about. 4.  I see a follow-up with Franc Gooden for his blood pressure. I should see him in 2 months or so i.e. June for blood pressure follow-up. 5.  Lastly I thought his EKG came out good.

## 2023-04-04 NOTE — TELEPHONE ENCOUNTER
Spoke with patient relayed physician message below. Patient verbalized understanding. Provided contact information for Dr. Ramya Corrales.

## 2023-04-10 ENCOUNTER — OFFICE VISIT (OUTPATIENT)
Dept: SURGERY | Facility: CLINIC | Age: 68
End: 2023-04-10

## 2023-04-10 VITALS
WEIGHT: 300 LBS | HEIGHT: 72 IN | DIASTOLIC BLOOD PRESSURE: 97 MMHG | BODY MASS INDEX: 40.63 KG/M2 | SYSTOLIC BLOOD PRESSURE: 161 MMHG | HEART RATE: 89 BPM

## 2023-04-10 DIAGNOSIS — R97.20 ELEVATED PSA: Primary | ICD-10-CM

## 2023-04-10 PROCEDURE — 3080F DIAST BP >= 90 MM HG: CPT | Performed by: UROLOGY

## 2023-04-10 PROCEDURE — 1126F AMNT PAIN NOTED NONE PRSNT: CPT | Performed by: UROLOGY

## 2023-04-10 PROCEDURE — 3008F BODY MASS INDEX DOCD: CPT | Performed by: UROLOGY

## 2023-04-10 PROCEDURE — 99204 OFFICE O/P NEW MOD 45 MIN: CPT | Performed by: UROLOGY

## 2023-04-10 PROCEDURE — 3077F SYST BP >= 140 MM HG: CPT | Performed by: UROLOGY

## 2023-06-10 ENCOUNTER — HOSPITAL ENCOUNTER (OUTPATIENT)
Dept: MRI IMAGING | Facility: HOSPITAL | Age: 68
Discharge: HOME | End: 2023-06-10
Attending: UROLOGY
Payer: MEDICARE

## 2023-06-10 DIAGNOSIS — R97.20 ELEVATED PSA: ICD-10-CM

## 2023-06-10 PROCEDURE — 72197 MRI PELVIS W/O & W/DYE: CPT | Performed by: UROLOGY

## 2023-06-10 PROCEDURE — A9575 INJ GADOTERATE MEGLUMI 0.1ML: HCPCS | Performed by: UROLOGY

## 2023-06-10 RX ORDER — GADOTERATE MEGLUMINE 376.9 MG/ML
20 INJECTION INTRAVENOUS
Status: COMPLETED | OUTPATIENT
Start: 2023-06-10 | End: 2023-06-10

## 2023-06-10 RX ADMIN — GADOTERATE MEGLUMINE 20 ML: 376.9 INJECTION INTRAVENOUS at 13:10:00

## 2023-06-13 ENCOUNTER — TELEPHONE (OUTPATIENT)
Dept: SURGERY | Facility: CLINIC | Age: 68
End: 2023-06-13

## 2023-06-13 NOTE — TELEPHONE ENCOUNTER
I called pt and I told him that [facility] has not reviewed the MRI prostate results yet. Pt states he saw them on my chart and is very scared that something is wrong as he doesn't understand what the results mean. I told pt that I will send this msg to [facility] asking that he please review and advise and we will get back to him with a response. Please advise.

## 2023-06-13 NOTE — PROGRESS NOTES
Called patient and spoke to him over the phone. Discussed results of his prostate MRI from 6/12/2023 which revealed a highly suspicious PI-RADS 4 lesion within the right posterior lateral PZ mid gland. Findings concerning for prostate cancer. Recommend proceeding with a UroNav prostate biopsy for further evaluation and to rule out prostate cancer. Procedure discussed with patient including rationale, approach, benefits, risk, side effects, and alternatives. Discussed risks of bleeding, infection and urinary retention. They agreed to proceed. Maria Esther: Please schedule patient for a UroNav prostate biopsy. Antibiotics sent to pharmacy.     Aurelia Cleaning MD  6/13/2023

## 2023-06-19 NOTE — TELEPHONE ENCOUNTER
Spoke to patient' wife nicholas, scheduled Uro marcela fusion prostate biopsy Friday 07/14/2023, went over pre-op instructions, all sent to my chart.

## 2023-06-30 ENCOUNTER — TELEPHONE (OUTPATIENT)
Dept: SURGERY | Facility: CLINIC | Age: 68
End: 2023-06-30

## 2023-07-13 NOTE — DISCHARGE INSTRUCTIONS
Performed by Dr. Javon Olmos. 1. Hematuria (blood in urine) and/or blood in your bowel movement    Usually minimal-lasting 24 hours is expected  Hydrating with additional fluids such as water an juices is necessary to minimize hematuria. Blood may also be present in stool after bowel movement. Avoid straining during elimination, avoid constipating food items. Call ordering doctor if bleeding persists or worsens, such as a darker in appearance or thicker in consistency. 2. Activity  Rest quietly for the next 24 hours. Avoid straining, lifting heavy items, or strenuous physical activity for approximately 2 days. 3. Infection  Continue with antibiotics as ordered. If further signs or symptoms of infection occur such as:  fever (temp greater than 100) or severe discomfort persist, call the ordering doctor. Use tylenol for discomfort. Avoid aspirin, NSAIDs, and Ibuprofen products for 48 hours. 4.  Diet  No dietary restrictions except food products that cause constipation. Hydrate well. 5.  Results  Call/follow-up with ordering doctor for results, usually 5-7 days. 6.  Follow-up  The Radiology Department would like to contact you about your recovery and experience. Any additional questions may be answered at this time. Any questions, call [facility] Radiology Department at [phone]. Our hours are Monday through Friday 8AM - 4PM, Saturday 8AM-12PM and ask to speak to a nurse if you are unable to contact the ordering doctor.

## 2023-07-14 ENCOUNTER — HOSPITAL ENCOUNTER (OUTPATIENT)
Dept: ULTRASOUND IMAGING | Facility: HOSPITAL | Age: 68
Discharge: HOME | End: 2023-07-14
Attending: UROLOGY
Payer: MEDICARE

## 2023-07-14 DIAGNOSIS — R97.20 ELEVATED PSA: ICD-10-CM

## 2023-07-14 PROCEDURE — 76942 ECHO GUIDE FOR BIOPSY: CPT | Performed by: UROLOGY

## 2023-07-14 PROCEDURE — 55700 BIOPSY OF PROSTATE,NEEDLE/PUNCH: CPT | Performed by: UROLOGY

## 2023-07-14 NOTE — IMAGING NOTE
PATIENT ARRIVAL TIME:      ULTRASOUND TECH:    PHYSICIAN TO PERFORM PROCEDURE: DR IGNACIO    HISTORY TAKEN:    BASE LINE VITAL SIGNS:    ANTIBIOTICS TAKEN: YES    GENTAMICIN GIVEN IM: N/A    FLEETS ENEMA TAKEN Y/N      PROCEDURE EXPLAINED:  CONSENT OBTAINED:    PHYSICIAN ARRIVAL TIME:    TIME OUT COMPLETED @:     IMAGES/SCANS CORRELATED:    LIDOCAINE INSTILLED UTILIZING 22 G-20 CM CHIBA NEEDLE @     SAMPLING BEGUN @:    CORE SAMPLES WITH 18 G - 25 CM BARD BIOPSY NEEDLE:    SITES:     REGION OF INTEREST (MANDEEP),      RIGHT BASE/MID/APEX,      LEFT BASE/MID/APEX     SAMPLES APPLIED TO TELFA PRIOR TO IMMERSING INTO FORMALIN 10% SOLUTION    SAMPLING COMPLETED:    POST PROCEDURE VITAL SIGNS:    PATIENT DISCHARGED-AVS INSTRUCTIONS PROVIDED    SPECIMENS WALKED DOWN TO LAB/CYTOLOGY/GROSS ROOM

## 2023-07-14 NOTE — IMAGING NOTE
PATIENT ARRIVAL TIME:1441    ULTRASOUND TECH:ABRAHAM    PHYSICIAN TO PERFORM PROCEDURE: DR IGNACIO    HISTORY TAKEN: ELEVATED PSA    BASE LINE VITAL SIGNS:/87 HR 78 AND /84 HR 85 DR IGNACIO AWARE.      ANTIBIOTICS TAKEN: Y    FLEETS ENEMA TAKEN Y    PROCEDURE VEUQEOTCR:6806     CONSENT OBTAINED:1453    PHYSICIAN ARRIVAL TIME: 1518    TIME OUT COMPLETED @:1519    LIDOCAINE INSTILLED UTILIZING 22 G-20 CM CHIBA NEEDLE @ 5938     IMAGES/SCANS CORRELATED:1524    SAMPLING BEGUN @:1527    CORE SAMPLES WITH 18 G - 25 CM BARD BIOPSY NEEDLE:    SITES:     REGION OF INTEREST (MANDEEP),  3 PASSES     RIGHT MID/BASE/APEX,  2 PASSES EACH AREA    LEFT MID/BASE/APEX  2 PASSES EACH AREA     SAMPLES APPLIED TO TELFA PRIOR TO IMMERSING INTO FORMALIN 10% SOLUTION    SAMPLING COMPLETED: 1535    POST PROCEDURE VITAL SIGNS: /84  HR 77    SPECIMENS TO LAB/CYTOLOGY/GROSS ROOM    1548  PATIENT DISCHARGED-AVS INSTRUCTIONS PROVIDED

## 2023-07-14 NOTE — PROCEDURES
[facility]  Procedure Note  UroNav MRI-US Fusion TRUS-Guided Prostate Needle Biopsy     Angelica Zazueta Patient Status:  Outpatient    1955 MRN [id number]   Location [place] [address] Attending Jyoti Zamora MD   Hosp Day # 0 PCP Marine Newell MD     Angelica Zazueta is a 76year old male. He was found to have an elevated screening PSA level up to 4.65 ng/mL. Multiparametric MRI of the prostate revealed a PI-RADS 4 lesion within the right PZ mid gland. He was recommended to undergo a UroNav MRI-US fusion TRUS-guided prostate needle biopsy for further evaluation. All Risks, benefits and alternatives of the procedure were previously explained to the patient and he provided informed consent. PRE-OP:   Elevated screening PSA level (4.65 ng/mL)  Abnormal Prostate MRI    POST OP:   Elevated screening PSA level (4.65 ng/mL)  Abnormal Prostate MRI    PROCEDURE:   Transrectal ultrasound of the prostate; ultrasound guidance of biopsy; MRI-US fusion guidance of biopsy, transrectal needle biopsy of the prostate; administration of judah-prostatic nerve block. ANESTHESIA:   1 % Xylocaine solution judah-prostatic nerve block. FINDING:   Seminal vesicles: WNL   Hypoechoic prostate lesions suspicious for malignancy: not appreciated. Presence of intravesical median lobe: not appreciated. Prostate volume on ultrasound: 29.5 cc (W 4.3 cm x H 4.59 cm x L 2.73 cm)  Number of biopsies on right side: 6  Number of biopsies on left side: 6  Number of biopsies from MANDEEP #1 [right PZ mid]: 3  PSA-Density: 0.15 ng/mL/cc. DESCRIPTION OF PROCEDURE:   Transrectal ultrasonography of the prostate and seminal vesicles was performed throughout the entirety of both of these glands in the transverse ad sagittal planes using a Kabooza ultrasound System, and a 8 MHZ endorectal probe.  Representative pictures were taken of the base, mid-aspect, and apical aspects for the prostate gland and these were preserved on hard copy. Pathology was noted. Prostate volume was determined by imaging the prostate in the transverse and sagittal planes and determining maximum height, width and length dimensions. The decision was then made to proceed with prostate biopsy under ultrasound and MRI-US fusion guidance. Using a 7\" 22 gauge spinal needle and using ultrasound guidance, I injected  1% xylocaine solution in each of the following 2 locations: Junction of the prostate and seminal vesicle at the right base; junction of the prostate and seminal vesicle at the left base. After the infiltrations were performed, the [employer] system was utilized to perform MRI-ultrasound fusion to identify and delineate the region(s) of interest for targeted biopsy. Using a [employer] Max Core 18 G disposable biopsy instrument cores were obtained from the MANDEEP(s) as delineated in the findings section with good targeting. Then, standard sextant cores were obtained from the medial and lateral aspects of each lobe from the peripheral zone at the base, mid-aspect and apical aspects of the gland. The biopsies were performed with the aid of ultrasound imaging in the transverse and sagittal planes. Good cores were obtained and these were sent to Pathology in formalin for examination. The patient tolerated the procedure well. He is cautioned that his urine, stool, and semen may all show evidence of blood for the time being. Furthermore, he will finish his antibiotics as instructed. F/U in 2 weeks for pathology discussion.      Ayana Ramirez MD  07/14/23

## 2023-07-21 ENCOUNTER — TELEPHONE (OUTPATIENT)
Dept: SURGERY | Facility: CLINIC | Age: 68
End: 2023-07-21

## 2023-07-21 NOTE — TELEPHONE ENCOUNTER
Per pt would like call back with biopsy results, will be out of town only can be reached at [phone] or [phone]. Please call thank you.

## 2023-07-24 NOTE — TELEPHONE ENCOUNTER
Per wife pt was unable to provide her with all the information that was discussed and asking what the pt's next step is.  Please advise

## 2023-07-26 ENCOUNTER — TELEPHONE (OUTPATIENT)
Dept: SURGERY | Facility: CLINIC | Age: 68
End: 2023-07-26

## 2023-07-26 NOTE — TELEPHONE ENCOUNTER
Ayana Ramirez MD  7/23/2023  6:25 PM CDT       I called patient and spoke to him and his wife over the phone. Discussed pathology results from his recent UroNav prostate biopsy performed 7/14/2023. This revealed grade group 2 prostate cancer in 3/3 cores from the MANDEEP, 30% involvement with PNI. Additionally, 5/12 sextant cores (all on the right side) were positive with up to grade group 2 prostate cancer and 25% involvement, with PNI. With that being said, we will order a nuclear medicine bone scan and CT abdomen and pelvis to complete staging of his newly diagnosed prostate cancer. Urology staff: Please contact patient and let him know to schedule the bone scan and CAT scan. Also, his upcoming 15-minute appointment on 8/3/2023 needs to be changed to a end of day cancer discussion appointment. Thank you.      Ayana Ramirez MD  7/23/2023

## 2023-07-26 NOTE — TELEPHONE ENCOUNTER
Both imaging tests have been scheduled. Future Appointments   Date Time Provider Shelbi Marquezi   8/2/2023  7:00 AM [facility] CT MAIN RM1 [facility]   8/2/2023  8:00 AM [facility] NUC DOSE RM [facility] [address]   8/2/2023 11:00 AM [facility] NUC RM2 Mark [facility] [address]   8/3/2023  8:45 AM [facility]     I DO recommend obtaining a nuclear medicine bone scan and CT scan of the abdomen to evaluate for any spread of the prostate cancer. Please call [phone] to schedule both of these tests. It would be best to have them completed before your discussion appointment with me so that we have all the information we need available at the time.      Dr. Lazaro Maravilla   Written by Emile Lai MD on 7/23/2023  6:25 PM CDT  Seen by reinaldo Sawant on 7/24/2023 11:47 AM

## 2023-07-27 NOTE — TELEPHONE ENCOUNTER
Per pt still waiting for someone call and reschedule his post op appt for the afternoon. Per pt ok to call wife if he does not answer.  Please advise

## 2023-08-02 ENCOUNTER — HOSPITAL ENCOUNTER (OUTPATIENT)
Dept: NUCLEAR MEDICINE | Facility: HOSPITAL | Age: 68
Discharge: HOME | End: 2023-08-02
Attending: UROLOGY
Payer: MEDICARE

## 2023-08-02 ENCOUNTER — HOSPITAL ENCOUNTER (OUTPATIENT)
Dept: CT IMAGING | Facility: HOSPITAL | Age: 68
Discharge: HOME | End: 2023-08-02
Attending: UROLOGY
Payer: MEDICARE

## 2023-08-02 DIAGNOSIS — C61 PROSTATE CANCER (HCC): ICD-10-CM

## 2023-08-02 LAB
CREAT BLD-MCNC: 1 MG/DL
EGFRCR SERPLBLD CKD-EPI 2021: 82 ML/MIN/1.73M2 (ref 60–?)

## 2023-08-02 PROCEDURE — 74177 CT ABD & PELVIS W/CONTRAST: CPT | Performed by: UROLOGY

## 2023-08-02 PROCEDURE — 82565 ASSAY OF CREATININE: CPT

## 2023-08-02 PROCEDURE — 78306 BONE IMAGING WHOLE BODY: CPT | Performed by: UROLOGY

## 2023-08-17 ENCOUNTER — OFFICE VISIT (OUTPATIENT)
Dept: SURGERY | Facility: CLINIC | Age: 68
End: 2023-08-17

## 2023-08-17 DIAGNOSIS — C61 PROSTATE CANCER (HCC): Primary | ICD-10-CM

## 2023-08-17 PROCEDURE — 1159F MED LIST DOCD IN RCRD: CPT | Performed by: UROLOGY

## 2023-08-17 PROCEDURE — 99215 OFFICE O/P EST HI 40 MIN: CPT | Performed by: UROLOGY

## 2023-08-17 NOTE — PROGRESS NOTES
[facility] Urology  Follow-Up Visit    HPI: Ghada York is a 76year old male presents for a follow up visit. Patient was last seen on 4/10/23. Accompanied by his wife. INTERVAL HISTORY: Seen for elevated PSA levels, up to 4.65 ng/mL 2/2023. Repeat PSA 4/2023 remained elevated at 4.3 ng/mL. Prostate MRI 6/2023 revealed a PI-RADS 4 lesion in the right PZ mid gland, suspicious for clinically significant prostate cancer. Patient underwent a [facility] fusion prostate biopsy 7/14/2023 which revealed prostate adenocarcinoma as follows: Location Dave Score % of Pattern 4  Grade Group   Positive Cores / Total Cores  Tumor Length (mm)  % Tissue Involved       A. Region of interest 3+4 10 2 3/3 20 30    B. Left base - -- -- -- -- -    C. Left mid - - - - - -    D. Left apex - - - - - -    E. Right base 3+3 - 1 2/2 4 5    F. Right mid 3+4 10 2 2/2 12 25    G. Right apex 3+3 - 1 1/2 3 3     Staging NM bone scan and CT A/P completed and did not show any evidence of metastatic disease. He presents to further discuss his newly diagnosed prostate cancer and go over management options. He denies fevers or chills, chest pain or shortness of breath. No gross hematuria or dysuria. 1. Clinically Localized Intermediate Risk Prostate Cancer (cT1c cN0 cM0)  No family history of prostate cancer. Routine prostate cancer screening performed by patient's PCP. His PSA from 2/9/2023 was noted to be elevated to 4.65 ng/mL. This is increased from 2.49 ng/mL in 5/2019. Repeat PSA 4/4/2023 remained elevated at 4.3 ng/mL. Patient denies any significant LUTS. He denies gross hematuria or dysuria. No sensation of incomplete bladder emptying, straining to urinate, or weak stream.     Denies any significant erectile dysfunction. No bone pain, fevers or chills, chest pain or shortness of breath.     Prostate MRI 6/2023 revealed a PI-RADS 4 lesion in the right PZ mid gland, suspicious for clinically significant prostate cancer. Patient underwent a [facility] fusion prostate biopsy 7/14/2023 which revealed:  - Prostate volume of TRUS: 29.5 cc.  - PSA density: 0.15 ng/mL/cc  - GG 2 (Medina 3+4=7) Prostate AdenoCa in 3/3 cores from MANDEEP, 30% involvement, + PNI.  - GG 2 (Dave 3+4=7) PCa in 2/12 sextant cores (RM), 25% involvement, + PNI.  - GG 1 (Medina 3+3=6) PCa in 3/12 sextant cores (RB, RA), up to 5% involvement, + PNI. Staging nuclear medicine bone scan and CT of the abdomen and pelvis did not reveal any evidence of metastatic disease. PAST MEDICAL HISTORY: Hypertension (not on medication). Prostate cancer 7/2023     PAST SURGICAL HISTORY: Hand surgery. SOCIAL HISTORY:  and has 3 grownup children. No smoking or illicit drug use. Rare social alcohol. Works in [employer]. Reviewed past medical, surgical, family, and social history. Reviewed med list and allergies. REVIEW OF SYSTEMS:  Pertinent positives and negatives per HPI. A 12-point ROS was performed and is otherwise negative. EXAM:  There were no vitals taken for this visit. Physical Exam  Constitutional:       Appearance: He is well-developed. HENT:      Head: Normocephalic. Eyes:      General: No scleral icterus. Cardiovascular:      Rate and Rhythm: Normal rate. Pulmonary:      Effort: Pulmonary effort is normal.   Genitourinary:     Comments: PERLA not performed today however on 4/10/2023 revealed a 1+ enlarged prostate, smooth, nonnodular, nontender, symmetrical.  Skin:     General: Skin is warm and dry. Neurological:      Mental Status: He is alert and oriented to person, place, and time.    Psychiatric:         Mood and Affect: Mood normal.         Behavior: Behavior normal.       PATHOLOGY:    Pathology                                Case: [id number]       Provider:  Brittany Fragoso MD       Collected:           07/14/2023                 Location Dave Score % of Pattern 4  Grade Group   Positive Cores / Total Cores  Tumor Length (mm)  % Tissue Involved       A. Region of interest 3+4 10 2 3/3 20 30    B. Left base - -- -- -- -- -    C. Left mid - - - - - -    D. Left apex - - - - - -    E. Right base 3+3 - 1 2/2 4 5    F. Right mid 3+4 10 2 2/2 12 25    G. Right apex 3+3 - 1 1/2 3 3       LABS:      PSA   Latest Ref Rng <=4.00 ng/mL   8/19/2013 1.2    8/20/2014 1.3    3/16/2016 1.5    1/8/2018 2.6    5/23/2019 2.49    2/9/2023 4.65 (H)   4/4/2023 4.30 (H)        IMAGING:    NM BONE SCAN WB (8/2/2023): No evidence for metastatic disease to the bone. CT ABDOMEN+PELVIS (8/2/2023): 1. No metastatic disease is identified within the abdomen or pelvis. No adenopathy is seen within the abdomen or pelvis. 2. There is a dermal nodule within the cutaneous fat at the right paracentral back posteriorly at the level of L1. Clinical correlation with physical exam is suggested. UROLOGY PROCEDURE:  None performed today. IMPRESSION:  76year old male with clinically localized intermediate risk prostate cancer diagnosed upon [facility] prostate biopsy performed 7/14/2023 for evaluation of elevated screening PSA levels and an abnormal prostate MRI. Clinical stage IIb (T1c cN0 cM0). I discussed with the patient and accompanying family members my clinical impression of his diagnosis of adenocarcinoma of the prostate. We reviewed the clinical staging system for prostate cancer and the prognostic significance of biopsy pathology findings (Pittston score), clinical stage, and serum PSA in predicting both pathologic stage found at surgery as well as long-term outcomes following surgical or radiation therapy. We discussed how these data can be used to stratify patients as low-risk, intermediate-risk and high-risk for posttherapy failure and can direct choice of treatment appropriate to their risk assessment.     I reviewed the primary modes of treatment for prostate cancer: observation (Active Surveillance vs. watchful waiting), radiation therapy, radical prostatectomy and hormonal therapy. Regarding observation or Active Surveillance, they understand this is ideally suited for men with either clinically insignificant or slow growing cancer that meets strict criteria or for whom because of age or other concurrent medical conditions the risks of treatment are greater than the potential benefits of treatment. While this option avoids treatment-associated side effects, it does require frequent evaluation including blood tests, imaging studies (e.g. multiparametric prostate MRI), and repeat biopsies in order to monitor for potential disease progression. Patient understands that despite vigilant follow-up there will be cases where the cancer spreads and is no longer potentially curable with local therapies. I reiterated that for many men, Active Surveillance can be associated with increased anxiety and stress concerning their diagnosis. Regarding radiation therapy, we discussed the general categories of external beam radiotherapy (XRT) and interstitial seed implantation (brachytherapy), which may be delivered with or without adjuvant hormonal therapy depending on clinical circumstances. For XRT we discussed the different modes of delivery and general risks and benefits between conventional, 3-D conformal, cyberknife and intensity modulated radiotherapy. For brachytherapy I described high dose rate and permanent seed implantation. They understand that specific radiotherapy approaches have not shown equivalent outcomes for all risk groups, and, for example, concerning intermediate or high-risk patients, brachytherapy is generally considered inferior to XRT and not recommended for monotherapy.  I encouraged the patient to pursue consultation with a member of our radiation oncology department to receive their specific recommendations for radiation therapy based on the specifics of his diagnosis. Regarding surgery, I explained the operation that I primarily perform, robotic-assisted laparoscopic radical prostatectomy, and compared it with the alternative traditional open retropubic prostatectomy. I also mentioned the less commonly used but accepted perineal approach. The advantages and limitations of these various approaches were described in detail. The anticipated hospital and post-operative courses were reviewed. I highlighted the relevant anatomy, and we discussed the importance of neurovascular bundle preservation (nerve-sparing) to minimize negative effects on erectile function. I explained that concurrent pelvic lymphadenectomy is typically performed for higher risk disease, which serves both a diagnostic and potentially therapeutic purpose if metastatic disease is identified. While in the majority of cases bilateral nerve-sparing is appropriate and possible, they understand that in certain circumstances intentional partial or complete unilateral or bilateral neurovascular bundle resection is necessary, especially in cases where clinically and /or radiographically (MP-MRI) the surgeon has suspicion that cancer extends into that region. This is typically determined preoperatively, however at times intraoperatively surgical adjustments need to be made. We discussed the general risks of surgery, which include but are not limited to infection, bleeding, medical and anesthetic complications, and adjacent organ involvement or injury (rectum, bowel, nerves, blood vessels). We discussed the common specific risks of prostatectomy, which include stress urinary incontinence (which is commonly mild but which can also be severe in rare circumstances); erectile dysfunction (which may occur despite bilateral nerve-sparing); and the need for blood transfusion (which at present is an uncommon event with robotic prostatectomy).  We also discussed the risk of bladder neck contracture, vesicourethral anastomotic leak, lymphocele formation, and positioning related injuries. We then discussed the significance of adverse pathologic findings upon radical prostatectomy as well as the indications for and concept of adjuvant and salvage radiation therapy following radical prostatectomy. Regarding hormonal therapy, we discussed it is primarily used as systemic therapy for patients with advanced or metastatic disease, but that it also has been shown to be beneficial in conjunction with radiation therapy for certain categories of patients. The patient understands that hormonal therapy alone is not considered curative treatment and will slow the growth of but not eliminate prostate cancer. We discussed that for localized prostate cancer there is no single best treatment. The recommendations for therapy take into account additional non-cancer related patient factors, such as prostate size, baseline voiding status, baseline sexual function, as well as one's individual priorities, such as willingness to accept potential sexual dysfunction to minimize voiding bother, or vice-versa. They understand the final choice of therapy is based on an assessment of the relative medical risks and benefits of each treatment as well as the potential quality of life impacts specific to each treatment and their personal tolerance of these potential risks and impacts. I emphasized that regardless of which definitive treatment is selected, alterations in quality of life and function are expected and inevitable. The treatment options for his clinically localized prostate cancer were discussed, including active surveillance (when appropriate), radiation therapy (various forms), and radical prostatectomy (various approaches); these were discussed in detail with the patient. All of his questions were answered. Patient verbalized understanding.   He is undecided at this point and will consider available treatment options and further discuss with his wife and family. He will let us know of his finalized treatment intentions as soon as he reaches a decision. We will proceed accordingly. PLAN:  1. Patient will consider available treatment options for his newly diagnosed clinically localized intermediate risk prostate cancer. He will inform us of his finalized treatment intentions as soon as he reaches a decision. 61 mintues were spent on this visit including face-to-face discussion involving counseling, further management and treatment planning, reviewing testing and interpreting imaging results, ordering new testing, and documenting in patient's medical record.     Chela eRgalado MD  8/17/2023

## 2023-08-21 ENCOUNTER — TELEPHONE (OUTPATIENT)
Dept: SURGERY | Facility: CLINIC | Age: 68
End: 2023-08-21

## 2023-08-21 DIAGNOSIS — Z79.01 MONITORING FOR ANTICOAGULANT USE: ICD-10-CM

## 2023-08-21 DIAGNOSIS — Z01.818 PREOP EXAMINATION: ICD-10-CM

## 2023-08-21 DIAGNOSIS — R39.89 OTHER SYMPTOMS INVOLVING URINARY SYSTEM: ICD-10-CM

## 2023-08-21 DIAGNOSIS — C61 PROSTATE CANCER (HCC): Primary | ICD-10-CM

## 2023-08-21 DIAGNOSIS — Z51.81 MONITORING FOR ANTICOAGULANT USE: ICD-10-CM

## 2023-08-21 NOTE — TELEPHONE ENCOUNTER
Urology Surgery Scheduling Request    Location: [address] [place] [address] OR    Surgeon: Chaka Talley MD    Asst. Surgeon: TIMMY Young    Diagnosis: Prostate cancer. Procedure: Robotic assisted laparoscopic radical prostatectomy, bilateral pelvic lymph node dissection. Procedure CPT Code (if known): [id number], [id number]. Anesthesia: General     Time Frame: Next available September or October 2023. Time needed: 5 hours    Special Equipment: XI Robot    On Call to OR: Ancef (cefazolin) 3 g IV and Flagyl 500 mg IV. Admission: AM Admit    Pre-op Testing: CBC, BMP, PT/INR, PTT, CXR, Urinalysis, Urine Culture, and Type & Screen     Need Pre-op Clearance: PCP. Cardiac clearance at PCPs discretion. Estimated Post Op/Follow Up Appt: 7-10 days for desai removal with urology APN. Please schedule at time of surgery scheduling.     Mohit Dennison MD  8/21/2023

## 2023-08-21 NOTE — TELEPHONE ENCOUNTER
Josi Zamudio patient wants to proceed with prostatectomy, please forward surgery request.  Thanks zafar

## 2023-08-22 NOTE — TELEPHONE ENCOUNTER
Spoke with patient, scheduled  Robotic assisted laparoscopic radical prostatectomy, bilateral pelvic lymph node dissection, Friday 09/22/2023, [facility], went over pre-op/lab instructions, all sent to patient' my chart.

## 2023-08-31 ENCOUNTER — EKG ENCOUNTER (OUTPATIENT)
Dept: LAB | Age: 68
End: 2023-08-31
Attending: INTERNAL MEDICINE
Payer: MEDICARE

## 2023-08-31 ENCOUNTER — OFFICE VISIT (OUTPATIENT)
Dept: INTERNAL MEDICINE CLINIC | Facility: CLINIC | Age: 68
End: 2023-08-31

## 2023-08-31 VITALS
SYSTOLIC BLOOD PRESSURE: 146 MMHG | HEIGHT: 72 IN | WEIGHT: 286 LBS | TEMPERATURE: 98 F | OXYGEN SATURATION: 98 % | HEART RATE: 81 BPM | DIASTOLIC BLOOD PRESSURE: 90 MMHG | BODY MASS INDEX: 38.74 KG/M2

## 2023-08-31 DIAGNOSIS — C61 PROSTATE CANCER (HCC): ICD-10-CM

## 2023-08-31 DIAGNOSIS — Z01.818 PRE-OP EVALUATION: ICD-10-CM

## 2023-08-31 DIAGNOSIS — R03.0 ELEVATED BLOOD PRESSURE READING: ICD-10-CM

## 2023-08-31 DIAGNOSIS — E78.5 HYPERLIPIDEMIA, UNSPECIFIED HYPERLIPIDEMIA TYPE: ICD-10-CM

## 2023-08-31 DIAGNOSIS — Z00.00 ROUTINE HEALTH MAINTENANCE: ICD-10-CM

## 2023-08-31 DIAGNOSIS — Z01.818 PRE-OP EVALUATION: Primary | ICD-10-CM

## 2023-08-31 LAB
ATRIAL RATE: 64 BPM
P AXIS: 43 DEGREES
P-R INTERVAL: 156 MS
Q-T INTERVAL: 424 MS
QRS DURATION: 86 MS
QTC CALCULATION (BEZET): 437 MS
R AXIS: 16 DEGREES
T AXIS: -3 DEGREES
VENTRICULAR RATE: 64 BPM

## 2023-08-31 PROCEDURE — 93005 ELECTROCARDIOGRAM TRACING: CPT

## 2023-08-31 PROCEDURE — 93010 ELECTROCARDIOGRAM REPORT: CPT | Performed by: INTERNAL MEDICINE

## 2023-08-31 RX ORDER — AMLODIPINE BESYLATE 5 MG/1
5 TABLET ORAL DAILY
Qty: 30 TABLET | Refills: 11 | Status: SHIPPED | OUTPATIENT
Start: 2023-08-31 | End: 2024-08-25

## 2023-09-07 ENCOUNTER — TELEPHONE (OUTPATIENT)
Dept: INTERNAL MEDICINE CLINIC | Facility: CLINIC | Age: 68
End: 2023-09-07

## 2023-09-07 NOTE — TELEPHONE ENCOUNTER
Please call patient's wife Earline Bonds. Please let her know that I know Mauri Arzate saw the cardiologist Tuesday. He was to get an echocardiogram.  Please ask what date this will be on and I can make sure a follow-up to complete Terell's preop evaluation.

## 2023-09-11 NOTE — TELEPHONE ENCOUNTER
Please call cardiology at [phone]. I believe patient recently saw Dr. Ahmet Campbell. He had an echocardiogram.    Please ask for fax of echocardiogram.  Unfortunately last weeks Fax did not come through. In addition please ask if Dr. Ahmet Campbell was able to give a cardiac clearance letter from patient.

## 2023-09-12 ENCOUNTER — OFFICE VISIT (OUTPATIENT)
Dept: INTERNAL MEDICINE CLINIC | Facility: CLINIC | Age: 68
End: 2023-09-12

## 2023-09-12 VITALS
TEMPERATURE: 98 F | WEIGHT: 285 LBS | HEART RATE: 73 BPM | RESPIRATION RATE: 16 BRPM | HEIGHT: 72 IN | OXYGEN SATURATION: 98 % | SYSTOLIC BLOOD PRESSURE: 140 MMHG | DIASTOLIC BLOOD PRESSURE: 82 MMHG | BODY MASS INDEX: 38.6 KG/M2

## 2023-09-12 DIAGNOSIS — Z00.00 ROUTINE HEALTH MAINTENANCE: ICD-10-CM

## 2023-09-12 DIAGNOSIS — C61 PROSTATE CANCER (HCC): ICD-10-CM

## 2023-09-12 DIAGNOSIS — I10 PRIMARY HYPERTENSION: ICD-10-CM

## 2023-09-12 DIAGNOSIS — Z01.811 PRE-OP CHEST EXAM: Primary | ICD-10-CM

## 2023-09-12 DIAGNOSIS — E78.5 HYPERLIPIDEMIA, UNSPECIFIED HYPERLIPIDEMIA TYPE: ICD-10-CM

## 2023-09-12 PROCEDURE — 3008F BODY MASS INDEX DOCD: CPT | Performed by: INTERNAL MEDICINE

## 2023-09-12 PROCEDURE — 1126F AMNT PAIN NOTED NONE PRSNT: CPT | Performed by: INTERNAL MEDICINE

## 2023-09-12 PROCEDURE — 99214 OFFICE O/P EST MOD 30 MIN: CPT | Performed by: INTERNAL MEDICINE

## 2023-09-12 PROCEDURE — 3077F SYST BP >= 140 MM HG: CPT | Performed by: INTERNAL MEDICINE

## 2023-09-12 PROCEDURE — 1159F MED LIST DOCD IN RCRD: CPT | Performed by: INTERNAL MEDICINE

## 2023-09-12 PROCEDURE — 3079F DIAST BP 80-89 MM HG: CPT | Performed by: INTERNAL MEDICINE

## 2023-09-13 ENCOUNTER — HOSPITAL ENCOUNTER (OUTPATIENT)
Dept: GENERAL RADIOLOGY | Facility: HOSPITAL | Age: 68
Discharge: HOME | End: 2023-09-13
Attending: UROLOGY
Payer: MEDICARE

## 2023-09-13 ENCOUNTER — LAB ENCOUNTER (OUTPATIENT)
Dept: LAB | Facility: HOSPITAL | Age: 68
End: 2023-09-13
Attending: UROLOGY
Payer: MEDICARE

## 2023-09-13 DIAGNOSIS — R39.89 OTHER SYMPTOMS INVOLVING URINARY SYSTEM: ICD-10-CM

## 2023-09-13 DIAGNOSIS — Z01.818 PREOP EXAMINATION: ICD-10-CM

## 2023-09-13 DIAGNOSIS — Z79.01 MONITORING FOR ANTICOAGULANT USE: ICD-10-CM

## 2023-09-13 DIAGNOSIS — I10 PRIMARY HYPERTENSION: ICD-10-CM

## 2023-09-13 DIAGNOSIS — Z51.81 MONITORING FOR ANTICOAGULANT USE: ICD-10-CM

## 2023-09-13 LAB
ANION GAP SERPL CALC-SCNC: 7 MMOL/L (ref 0–18)
ANTIBODY SCREEN: NEGATIVE
APTT PPP: 31.1 SECONDS (ref 23.3–35.6)
BASOPHILS # BLD AUTO: 0.08 X10(3) UL (ref 0–0.2)
BASOPHILS NFR BLD AUTO: 0.9 %
BILIRUB UR QL: NEGATIVE
BUN BLD-MCNC: 25 MG/DL (ref 7–18)
BUN/CREAT SERPL: 24 (ref 10–20)
CALCIUM BLD-MCNC: 8.7 MG/DL (ref 8.5–10.1)
CHLORIDE SERPL-SCNC: 108 MMOL/L (ref 98–112)
CHOLEST SERPL-MCNC: 164 MG/DL (ref ?–200)
CLARITY UR: CLEAR
CO2 SERPL-SCNC: 25 MMOL/L (ref 21–32)
CREAT BLD-MCNC: 1.04 MG/DL
DEPRECATED RDW RBC AUTO: 43.5 FL (ref 35.1–46.3)
EGFRCR SERPLBLD CKD-EPI 2021: 78 ML/MIN/1.73M2 (ref 60–?)
EOSINOPHIL # BLD AUTO: 0.26 X10(3) UL (ref 0–0.7)
EOSINOPHIL NFR BLD AUTO: 2.9 %
ERYTHROCYTE [DISTWIDTH] IN BLOOD BY AUTOMATED COUNT: 12.8 % (ref 11–15)
FASTING PATIENT LIPID ANSWER: YES
FASTING STATUS PATIENT QL REPORTED: YES
GLUCOSE BLD-MCNC: 83 MG/DL (ref 70–99)
GLUCOSE UR-MCNC: NORMAL MG/DL
HCT VFR BLD AUTO: 43 %
HDLC SERPL-MCNC: 56 MG/DL (ref 40–59)
HGB BLD-MCNC: 14.5 G/DL
HGB UR QL STRIP.AUTO: NEGATIVE
IMM GRANULOCYTES # BLD AUTO: 0.02 X10(3) UL (ref 0–1)
IMM GRANULOCYTES NFR BLD: 0.2 %
INR BLD: 1.02 (ref 0.85–1.16)
LDLC SERPL CALC-MCNC: 97 MG/DL (ref ?–100)
LEUKOCYTE ESTERASE UR QL STRIP.AUTO: NEGATIVE
LYMPHOCYTES # BLD AUTO: 2.04 X10(3) UL (ref 1–4)
LYMPHOCYTES NFR BLD AUTO: 22.9 %
MCH RBC QN AUTO: 31.2 PG (ref 26–34)
MCHC RBC AUTO-ENTMCNC: 33.7 G/DL (ref 31–37)
MCV RBC AUTO: 92.5 FL
MONOCYTES # BLD AUTO: 0.74 X10(3) UL (ref 0.1–1)
MONOCYTES NFR BLD AUTO: 8.3 %
NEUTROPHILS # BLD AUTO: 5.76 X10 (3) UL (ref 1.5–7.7)
NEUTROPHILS # BLD AUTO: 5.76 X10(3) UL (ref 1.5–7.7)
NEUTROPHILS NFR BLD AUTO: 64.8 %
NITRITE UR QL STRIP.AUTO: NEGATIVE
NONHDLC SERPL-MCNC: 108 MG/DL (ref ?–130)
OSMOLALITY SERPL CALC.SUM OF ELEC: 294 MOSM/KG (ref 275–295)
PH UR: 5 [PH] (ref 5–8)
PLATELET # BLD AUTO: 236 10(3)UL (ref 150–450)
POTASSIUM SERPL-SCNC: 3.7 MMOL/L (ref 3.5–5.1)
PROT UR-MCNC: NEGATIVE MG/DL
PROTHROMBIN TIME: 13.3 SECONDS (ref 11.6–14.8)
RBC # BLD AUTO: 4.65 X10(6)UL
RH BLOOD TYPE: POSITIVE
RH BLOOD TYPE: POSITIVE
SODIUM SERPL-SCNC: 140 MMOL/L (ref 136–145)
SP GR UR STRIP: 1.02 (ref 1–1.03)
TRIGL SERPL-MCNC: 52 MG/DL (ref 30–149)
UROBILINOGEN UR STRIP-ACNC: NORMAL
VLDLC SERPL CALC-MCNC: 9 MG/DL (ref 0–30)
WBC # BLD AUTO: 8.9 X10(3) UL (ref 4–11)

## 2023-09-13 PROCEDURE — 80048 BASIC METABOLIC PNL TOTAL CA: CPT

## 2023-09-13 PROCEDURE — 86850 RBC ANTIBODY SCREEN: CPT

## 2023-09-13 PROCEDURE — 81003 URINALYSIS AUTO W/O SCOPE: CPT

## 2023-09-13 PROCEDURE — 85610 PROTHROMBIN TIME: CPT

## 2023-09-13 PROCEDURE — 36415 COLL VENOUS BLD VENIPUNCTURE: CPT

## 2023-09-13 PROCEDURE — 86900 BLOOD TYPING SEROLOGIC ABO: CPT

## 2023-09-13 PROCEDURE — 86901 BLOOD TYPING SEROLOGIC RH(D): CPT

## 2023-09-13 PROCEDURE — 85025 COMPLETE CBC W/AUTO DIFF WBC: CPT

## 2023-09-13 PROCEDURE — 85730 THROMBOPLASTIN TIME PARTIAL: CPT

## 2023-09-13 PROCEDURE — 80061 LIPID PANEL: CPT

## 2023-09-13 PROCEDURE — 71046 X-RAY EXAM CHEST 2 VIEWS: CPT | Performed by: UROLOGY

## 2023-09-13 PROCEDURE — 87086 URINE CULTURE/COLONY COUNT: CPT | Performed by: UROLOGY

## 2023-09-14 ENCOUNTER — TELEPHONE (OUTPATIENT)
Dept: INTERNAL MEDICINE CLINIC | Facility: CLINIC | Age: 68
End: 2023-09-14

## 2023-09-14 NOTE — TELEPHONE ENCOUNTER
Please let patient know that his cholesterol that we did came out satisfactory. Also the labs that Dr. Efren Donahue did preoperatively along with chest x-ray came out good.     It appears he is all set for his upcoming prostate surgery

## 2023-09-22 ENCOUNTER — HOSPITAL ENCOUNTER (OUTPATIENT)
Facility: HOSPITAL | Age: 68
Discharge: HOME | End: 2023-09-23
Attending: UROLOGY | Admitting: UROLOGY
Payer: MEDICARE

## 2023-09-22 ENCOUNTER — ANESTHESIA (OUTPATIENT)
Dept: SURGERY | Facility: HOSPITAL | Age: 68
End: 2023-09-22
Payer: MEDICARE

## 2023-09-22 ENCOUNTER — ANESTHESIA EVENT (OUTPATIENT)
Dept: SURGERY | Facility: HOSPITAL | Age: 68
End: 2023-09-22
Payer: MEDICARE

## 2023-09-22 DIAGNOSIS — C61 PROSTATE CANCER (HCC): ICD-10-CM

## 2023-09-22 DIAGNOSIS — K81.9 CHOLECYSTITIS: Primary | ICD-10-CM

## 2023-09-22 PROBLEM — I10 ESSENTIAL HYPERTENSION: Status: ACTIVE | Noted: 2023-09-22

## 2023-09-22 PROCEDURE — 8E0W4CZ ROBOTIC ASSISTED PROCEDURE OF TRUNK REGION, PERCUTANEOUS ENDOSCOPIC APPROACH: ICD-10-PCS | Performed by: UROLOGY

## 2023-09-22 PROCEDURE — 07TC4ZZ RESECTION OF PELVIS LYMPHATIC, PERCUTANEOUS ENDOSCOPIC APPROACH: ICD-10-PCS | Performed by: UROLOGY

## 2023-09-22 PROCEDURE — 38571 LAPAROSCOPY LYMPHADENECTOMY: CPT | Performed by: UROLOGY

## 2023-09-22 PROCEDURE — 55866 LAPS SURG PRST8ECT RPBIC RAD: CPT | Performed by: UROLOGY

## 2023-09-22 PROCEDURE — P9045 ALBUMIN (HUMAN), 5%, 250 ML: HCPCS | Performed by: NURSE ANESTHETIST, CERTIFIED REGISTERED

## 2023-09-22 PROCEDURE — 0VT04ZZ RESECTION OF PROSTATE, PERCUTANEOUS ENDOSCOPIC APPROACH: ICD-10-PCS | Performed by: UROLOGY

## 2023-09-22 PROCEDURE — 99204 OFFICE O/P NEW MOD 45 MIN: CPT | Performed by: HOSPITALIST

## 2023-09-22 RX ORDER — ACETAMINOPHEN 10 MG/ML
1000 INJECTION, SOLUTION INTRAVENOUS EVERY 8 HOURS
Status: DISCONTINUED | OUTPATIENT
Start: 2023-09-22 | End: 2023-09-23

## 2023-09-22 RX ORDER — FAMOTIDINE 20 MG/1
20 TABLET, FILM COATED ORAL ONCE
Status: COMPLETED | OUTPATIENT
Start: 2023-09-22 | End: 2023-09-22

## 2023-09-22 RX ORDER — SODIUM CHLORIDE 9 MG/ML
INJECTION, SOLUTION INTRAVENOUS CONTINUOUS PRN
Status: DISCONTINUED | OUTPATIENT
Start: 2023-09-22 | End: 2023-09-22 | Stop reason: SURG

## 2023-09-22 RX ORDER — DEXAMETHASONE SODIUM PHOSPHATE 4 MG/ML
VIAL (ML) INJECTION AS NEEDED
Status: DISCONTINUED | OUTPATIENT
Start: 2023-09-22 | End: 2023-09-22 | Stop reason: SURG

## 2023-09-22 RX ORDER — KETAMINE HYDROCHLORIDE 50 MG/ML
INJECTION, SOLUTION, CONCENTRATE INTRAMUSCULAR; INTRAVENOUS AS NEEDED
Status: DISCONTINUED | OUTPATIENT
Start: 2023-09-22 | End: 2023-09-22 | Stop reason: SURG

## 2023-09-22 RX ORDER — METOCLOPRAMIDE 10 MG/1
10 TABLET ORAL ONCE
Status: COMPLETED | OUTPATIENT
Start: 2023-09-22 | End: 2023-09-22

## 2023-09-22 RX ORDER — AMLODIPINE BESYLATE 5 MG/1
5 TABLET ORAL DAILY
Status: DISCONTINUED | OUTPATIENT
Start: 2023-09-23 | End: 2023-09-23

## 2023-09-22 RX ORDER — PHENYLEPHRINE HCL 10 MG/ML
VIAL (ML) INJECTION AS NEEDED
Status: DISCONTINUED | OUTPATIENT
Start: 2023-09-22 | End: 2023-09-22 | Stop reason: SURG

## 2023-09-22 RX ORDER — EPHEDRINE SULFATE 50 MG/ML
INJECTION, SOLUTION INTRAVENOUS AS NEEDED
Status: DISCONTINUED | OUTPATIENT
Start: 2023-09-22 | End: 2023-09-22 | Stop reason: SURG

## 2023-09-22 RX ORDER — NALOXONE HYDROCHLORIDE 0.4 MG/ML
80 INJECTION, SOLUTION INTRAMUSCULAR; INTRAVENOUS; SUBCUTANEOUS AS NEEDED
Status: DISCONTINUED | OUTPATIENT
Start: 2023-09-22 | End: 2023-09-22 | Stop reason: HOSPADM

## 2023-09-22 RX ORDER — ROCURONIUM BROMIDE 10 MG/ML
INJECTION, SOLUTION INTRAVENOUS AS NEEDED
Status: DISCONTINUED | OUTPATIENT
Start: 2023-09-22 | End: 2023-09-22 | Stop reason: SURG

## 2023-09-22 RX ORDER — LIDOCAINE HYDROCHLORIDE 10 MG/ML
INJECTION, SOLUTION EPIDURAL; INFILTRATION; INTRACAUDAL; PERINEURAL AS NEEDED
Status: DISCONTINUED | OUTPATIENT
Start: 2023-09-22 | End: 2023-09-22 | Stop reason: SURG

## 2023-09-22 RX ORDER — MORPHINE SULFATE 4 MG/ML
2 INJECTION, SOLUTION INTRAMUSCULAR; INTRAVENOUS EVERY 10 MIN PRN
Status: DISCONTINUED | OUTPATIENT
Start: 2023-09-22 | End: 2023-09-22 | Stop reason: HOSPADM

## 2023-09-22 RX ORDER — OXYCODONE HYDROCHLORIDE 5 MG/1
5 TABLET ORAL EVERY 6 HOURS PRN
Status: DISCONTINUED | OUTPATIENT
Start: 2023-09-22 | End: 2023-09-23

## 2023-09-22 RX ORDER — MORPHINE SULFATE 10 MG/ML
6 INJECTION, SOLUTION INTRAMUSCULAR; INTRAVENOUS EVERY 10 MIN PRN
Status: DISCONTINUED | OUTPATIENT
Start: 2023-09-22 | End: 2023-09-22 | Stop reason: HOSPADM

## 2023-09-22 RX ORDER — ACETAMINOPHEN 500 MG
1000 TABLET ORAL ONCE
Status: COMPLETED | OUTPATIENT
Start: 2023-09-22 | End: 2023-09-22

## 2023-09-22 RX ORDER — SODIUM CHLORIDE, SODIUM LACTATE, POTASSIUM CHLORIDE, CALCIUM CHLORIDE 600; 310; 30; 20 MG/100ML; MG/100ML; MG/100ML; MG/100ML
INJECTION, SOLUTION INTRAVENOUS CONTINUOUS
Status: DISCONTINUED | OUTPATIENT
Start: 2023-09-22 | End: 2023-09-22

## 2023-09-22 RX ORDER — MORPHINE SULFATE 4 MG/ML
4 INJECTION, SOLUTION INTRAMUSCULAR; INTRAVENOUS EVERY 10 MIN PRN
Status: DISCONTINUED | OUTPATIENT
Start: 2023-09-22 | End: 2023-09-22 | Stop reason: HOSPADM

## 2023-09-22 RX ORDER — SCOLOPAMINE TRANSDERMAL SYSTEM 1 MG/1
1 PATCH, EXTENDED RELEASE TRANSDERMAL ONCE
Status: DISCONTINUED | OUTPATIENT
Start: 2023-09-22 | End: 2023-09-22

## 2023-09-22 RX ORDER — OXYCODONE HYDROCHLORIDE 5 MG/1
10 TABLET ORAL EVERY 6 HOURS PRN
Status: DISCONTINUED | OUTPATIENT
Start: 2023-09-22 | End: 2023-09-23

## 2023-09-22 RX ORDER — MIDAZOLAM HYDROCHLORIDE 1 MG/ML
INJECTION INTRAMUSCULAR; INTRAVENOUS AS NEEDED
Status: DISCONTINUED | OUTPATIENT
Start: 2023-09-22 | End: 2023-09-22 | Stop reason: SURG

## 2023-09-22 RX ORDER — LABETALOL HYDROCHLORIDE 5 MG/ML
INJECTION, SOLUTION INTRAVENOUS AS NEEDED
Status: DISCONTINUED | OUTPATIENT
Start: 2023-09-22 | End: 2023-09-22 | Stop reason: SURG

## 2023-09-22 RX ORDER — ONDANSETRON 2 MG/ML
INJECTION INTRAMUSCULAR; INTRAVENOUS AS NEEDED
Status: DISCONTINUED | OUTPATIENT
Start: 2023-09-22 | End: 2023-09-22 | Stop reason: SURG

## 2023-09-22 RX ORDER — SODIUM CHLORIDE, SODIUM LACTATE, POTASSIUM CHLORIDE, CALCIUM CHLORIDE 600; 310; 30; 20 MG/100ML; MG/100ML; MG/100ML; MG/100ML
INJECTION, SOLUTION INTRAVENOUS CONTINUOUS PRN
Status: DISCONTINUED | OUTPATIENT
Start: 2023-09-22 | End: 2023-09-22 | Stop reason: SURG

## 2023-09-22 RX ORDER — ALBUMIN, HUMAN INJ 5% 5 %
SOLUTION INTRAVENOUS CONTINUOUS PRN
Status: DISCONTINUED | OUTPATIENT
Start: 2023-09-22 | End: 2023-09-22 | Stop reason: SURG

## 2023-09-22 RX ORDER — HYDROMORPHONE HYDROCHLORIDE 1 MG/ML
0.2 INJECTION, SOLUTION INTRAMUSCULAR; INTRAVENOUS; SUBCUTANEOUS EVERY 5 MIN PRN
Status: DISCONTINUED | OUTPATIENT
Start: 2023-09-22 | End: 2023-09-22 | Stop reason: HOSPADM

## 2023-09-22 RX ORDER — LIDOCAINE HYDROCHLORIDE ANHYDROUS AND DEXTROSE MONOHYDRATE .8; 5 G/100ML; G/100ML
INJECTION, SOLUTION INTRAVENOUS CONTINUOUS PRN
Status: DISCONTINUED | OUTPATIENT
Start: 2023-09-22 | End: 2023-09-22 | Stop reason: SURG

## 2023-09-22 RX ORDER — POLYETHYLENE GLYCOL 3350 17 G/17G
17 POWDER, FOR SOLUTION ORAL DAILY PRN
Status: DISCONTINUED | OUTPATIENT
Start: 2023-09-22 | End: 2023-09-23

## 2023-09-22 RX ORDER — SODIUM CHLORIDE, SODIUM LACTATE, POTASSIUM CHLORIDE, CALCIUM CHLORIDE 600; 310; 30; 20 MG/100ML; MG/100ML; MG/100ML; MG/100ML
INJECTION, SOLUTION INTRAVENOUS CONTINUOUS
Status: DISCONTINUED | OUTPATIENT
Start: 2023-09-22 | End: 2023-09-22 | Stop reason: HOSPADM

## 2023-09-22 RX ORDER — HYDROMORPHONE HYDROCHLORIDE 1 MG/ML
0.6 INJECTION, SOLUTION INTRAMUSCULAR; INTRAVENOUS; SUBCUTANEOUS EVERY 5 MIN PRN
Status: DISCONTINUED | OUTPATIENT
Start: 2023-09-22 | End: 2023-09-22 | Stop reason: HOSPADM

## 2023-09-22 RX ORDER — MAGNESIUM SULFATE HEPTAHYDRATE 500 MG/ML
INJECTION, SOLUTION INTRAMUSCULAR; INTRAVENOUS AS NEEDED
Status: DISCONTINUED | OUTPATIENT
Start: 2023-09-22 | End: 2023-09-22 | Stop reason: SURG

## 2023-09-22 RX ORDER — BUPIVACAINE HYDROCHLORIDE 5 MG/ML
INJECTION, SOLUTION EPIDURAL; INTRACAUDAL AS NEEDED
Status: DISCONTINUED | OUTPATIENT
Start: 2023-09-22 | End: 2023-09-22 | Stop reason: HOSPADM

## 2023-09-22 RX ORDER — SODIUM CHLORIDE 9 MG/ML
INJECTION, SOLUTION INTRAVENOUS CONTINUOUS
Status: DISCONTINUED | OUTPATIENT
Start: 2023-09-22 | End: 2023-09-23

## 2023-09-22 RX ORDER — SENNOSIDES 8.6 MG
17.2 TABLET ORAL NIGHTLY
Status: DISCONTINUED | OUTPATIENT
Start: 2023-09-22 | End: 2023-09-23

## 2023-09-22 RX ORDER — METRONIDAZOLE 500 MG/100ML
500 INJECTION, SOLUTION INTRAVENOUS ONCE
Status: COMPLETED | OUTPATIENT
Start: 2023-09-22 | End: 2023-09-22

## 2023-09-22 RX ORDER — DOCUSATE SODIUM 100 MG/1
100 CAPSULE, LIQUID FILLED ORAL 2 TIMES DAILY
Status: DISCONTINUED | OUTPATIENT
Start: 2023-09-22 | End: 2023-09-23

## 2023-09-22 RX ORDER — GLYCOPYRROLATE 0.2 MG/ML
INJECTION, SOLUTION INTRAMUSCULAR; INTRAVENOUS AS NEEDED
Status: DISCONTINUED | OUTPATIENT
Start: 2023-09-22 | End: 2023-09-22 | Stop reason: SURG

## 2023-09-22 RX ORDER — HYDROMORPHONE HYDROCHLORIDE 1 MG/ML
0.4 INJECTION, SOLUTION INTRAMUSCULAR; INTRAVENOUS; SUBCUTANEOUS EVERY 5 MIN PRN
Status: DISCONTINUED | OUTPATIENT
Start: 2023-09-22 | End: 2023-09-22 | Stop reason: HOSPADM

## 2023-09-22 RX ORDER — HEPARIN SODIUM 5000 [USP'U]/ML
5000 INJECTION, SOLUTION INTRAVENOUS; SUBCUTANEOUS EVERY 12 HOURS SCHEDULED
Status: DISCONTINUED | OUTPATIENT
Start: 2023-09-22 | End: 2023-09-23

## 2023-09-22 RX ORDER — ONDANSETRON 2 MG/ML
4 INJECTION INTRAMUSCULAR; INTRAVENOUS EVERY 6 HOURS PRN
Status: DISCONTINUED | OUTPATIENT
Start: 2023-09-22 | End: 2023-09-23

## 2023-09-22 RX ORDER — METOCLOPRAMIDE HYDROCHLORIDE 5 MG/ML
10 INJECTION INTRAMUSCULAR; INTRAVENOUS EVERY 8 HOURS PRN
Status: DISCONTINUED | OUTPATIENT
Start: 2023-09-22 | End: 2023-09-23

## 2023-09-22 RX ORDER — CEFAZOLIN SODIUM IN 0.9 % NACL 3 G/100 ML
3 INTRAVENOUS SOLUTION, PIGGYBACK (ML) INTRAVENOUS EVERY 8 HOURS
Qty: 200 ML | Refills: 0 | Status: COMPLETED | OUTPATIENT
Start: 2023-09-22 | End: 2023-09-23

## 2023-09-22 RX ORDER — HYDROMORPHONE HYDROCHLORIDE 1 MG/ML
0.5 INJECTION, SOLUTION INTRAMUSCULAR; INTRAVENOUS; SUBCUTANEOUS EVERY 4 HOURS PRN
Status: DISCONTINUED | OUTPATIENT
Start: 2023-09-22 | End: 2023-09-23

## 2023-09-22 RX ORDER — CEFAZOLIN SODIUM IN 0.9 % NACL 3 G/100 ML
3 INTRAVENOUS SOLUTION, PIGGYBACK (ML) INTRAVENOUS ONCE
Status: COMPLETED | OUTPATIENT
Start: 2023-09-22 | End: 2023-09-22

## 2023-09-22 RX ADMIN — ONDANSETRON 4 MG: 2 INJECTION INTRAMUSCULAR; INTRAVENOUS at 12:24:00

## 2023-09-22 RX ADMIN — PHENYLEPHRINE HCL 100 MCG: 10 MG/ML VIAL (ML) INJECTION at 07:51:00

## 2023-09-22 RX ADMIN — ALBUMIN, HUMAN INJ 5%: 5 SOLUTION INTRAVENOUS at 10:49:00

## 2023-09-22 RX ADMIN — CEFAZOLIN SODIUM IN 0.9 % NACL 3 G: 3 G/100 ML INTRAVENOUS SOLUTION, PIGGYBACK (ML) INTRAVENOUS at 07:52:00

## 2023-09-22 RX ADMIN — ROCURONIUM BROMIDE 20 MG: 10 INJECTION, SOLUTION INTRAVENOUS at 10:18:00

## 2023-09-22 RX ADMIN — GLYCOPYRROLATE 0.1 MG: 0.2 INJECTION, SOLUTION INTRAMUSCULAR; INTRAVENOUS at 07:33:00

## 2023-09-22 RX ADMIN — ROCURONIUM BROMIDE 5 MG: 10 INJECTION, SOLUTION INTRAVENOUS at 11:57:00

## 2023-09-22 RX ADMIN — ROCURONIUM BROMIDE 5 MG: 10 INJECTION, SOLUTION INTRAVENOUS at 07:39:00

## 2023-09-22 RX ADMIN — CEFAZOLIN SODIUM IN 0.9 % NACL 3 G: 3 G/100 ML INTRAVENOUS SOLUTION, PIGGYBACK (ML) INTRAVENOUS at 11:30:00

## 2023-09-22 RX ADMIN — EPHEDRINE SULFATE 5 MG: 50 INJECTION, SOLUTION INTRAVENOUS at 07:51:00

## 2023-09-22 RX ADMIN — LIDOCAINE HYDROCHLORIDE ANHYDROUS AND DEXTROSE MONOHYDRATE 1 MG/MIN: .8; 5 INJECTION, SOLUTION INTRAVENOUS at 08:03:00

## 2023-09-22 RX ADMIN — KETAMINE HYDROCHLORIDE 10 MG: 50 INJECTION, SOLUTION, CONCENTRATE INTRAMUSCULAR; INTRAVENOUS at 08:08:00

## 2023-09-22 RX ADMIN — LIDOCAINE HYDROCHLORIDE 50 MG: 10 INJECTION, SOLUTION EPIDURAL; INFILTRATION; INTRACAUDAL; PERINEURAL at 07:39:00

## 2023-09-22 RX ADMIN — ROCURONIUM BROMIDE 20 MG: 10 INJECTION, SOLUTION INTRAVENOUS at 09:27:00

## 2023-09-22 RX ADMIN — SODIUM CHLORIDE, SODIUM LACTATE, POTASSIUM CHLORIDE, CALCIUM CHLORIDE: 600; 310; 30; 20 INJECTION, SOLUTION INTRAVENOUS at 07:33:00

## 2023-09-22 RX ADMIN — LIDOCAINE HYDROCHLORIDE ANHYDROUS AND DEXTROSE MONOHYDRATE 0.5 MG/MIN: .8; 5 INJECTION, SOLUTION INTRAVENOUS at 11:14:00

## 2023-09-22 RX ADMIN — KETAMINE HYDROCHLORIDE 15 MG: 50 INJECTION, SOLUTION, CONCENTRATE INTRAMUSCULAR; INTRAVENOUS at 09:20:00

## 2023-09-22 RX ADMIN — SODIUM CHLORIDE, SODIUM LACTATE, POTASSIUM CHLORIDE, CALCIUM CHLORIDE: 600; 310; 30; 20 INJECTION, SOLUTION INTRAVENOUS at 12:41:00

## 2023-09-22 RX ADMIN — SODIUM CHLORIDE, SODIUM LACTATE, POTASSIUM CHLORIDE, CALCIUM CHLORIDE: 600; 310; 30; 20 INJECTION, SOLUTION INTRAVENOUS at 11:31:00

## 2023-09-22 RX ADMIN — LABETALOL HYDROCHLORIDE 2.5 MG: 5 INJECTION, SOLUTION INTRAVENOUS at 08:22:00

## 2023-09-22 RX ADMIN — ROCURONIUM BROMIDE 20 MG: 10 INJECTION, SOLUTION INTRAVENOUS at 08:27:00

## 2023-09-22 RX ADMIN — ROCURONIUM BROMIDE 10 MG: 10 INJECTION, SOLUTION INTRAVENOUS at 11:27:00

## 2023-09-22 RX ADMIN — KETAMINE HYDROCHLORIDE 5 MG: 50 INJECTION, SOLUTION, CONCENTRATE INTRAMUSCULAR; INTRAVENOUS at 10:13:00

## 2023-09-22 RX ADMIN — SODIUM CHLORIDE: 9 INJECTION, SOLUTION INTRAVENOUS at 07:43:00

## 2023-09-22 RX ADMIN — SODIUM CHLORIDE, SODIUM LACTATE, POTASSIUM CHLORIDE, CALCIUM CHLORIDE: 600; 310; 30; 20 INJECTION, SOLUTION INTRAVENOUS at 12:48:00

## 2023-09-22 RX ADMIN — ROCURONIUM BROMIDE 45 MG: 10 INJECTION, SOLUTION INTRAVENOUS at 07:51:00

## 2023-09-22 RX ADMIN — KETAMINE HYDROCHLORIDE 20 MG: 50 INJECTION, SOLUTION, CONCENTRATE INTRAMUSCULAR; INTRAVENOUS at 08:03:00

## 2023-09-22 RX ADMIN — MAGNESIUM SULFATE HEPTAHYDRATE 1 G: 500 INJECTION, SOLUTION INTRAMUSCULAR; INTRAVENOUS at 08:27:00

## 2023-09-22 RX ADMIN — DEXAMETHASONE SODIUM PHOSPHATE 8 MG: 4 MG/ML VIAL (ML) INJECTION at 07:51:00

## 2023-09-22 RX ADMIN — MIDAZOLAM HYDROCHLORIDE 2 MG: 1 INJECTION INTRAMUSCULAR; INTRAVENOUS at 07:33:00

## 2023-09-22 RX ADMIN — GLYCOPYRROLATE 0.1 MG: 0.2 INJECTION, SOLUTION INTRAMUSCULAR; INTRAVENOUS at 07:40:00

## 2023-09-22 RX ADMIN — METRONIDAZOLE 500 MG: 500 INJECTION, SOLUTION INTRAVENOUS at 07:33:00

## 2023-09-22 NOTE — PLAN OF CARE
Problem: Patient Centered Care  Goal: Patient preferences are identified and integrated in the patient's plan of care  Description: Interventions:  - What would you like us to know as we care for you? I want to go home. - Provide timely, complete, and accurate information to patient/family  - Incorporate patient and family knowledge, values, beliefs, and cultural backgrounds into the planning and delivery of care  - Encourage patient/family to participate in care and decision-making at the level they choose  - Honor patient and family perspectives and choices  Outcome: Progressing     Problem: Patient/Family Goals  Goal: Patient/Family Long Term Goal  Description: Patient's Long Term Goal: Patient will be pain free    Interventions:  - medications given  - See additional Care Plan goals for specific interventions  Outcome: Progressing  Goal: Patient/Family Short Term Goal  Description: Patient's Short Term Goal: Wants to eat. Interventions:   - diet will be advanced as tolerated per orders  - See additional Care Plan goals for specific interventions  Outcome: Progressing     Problem: Patient/Family Goals  Goal: Patient/Family Short Term Goal  Description: Patient's Short Term Goal: patient is able to return home.     Interventions:   - continue plan of care until discharge  - See additional Care Plan goals for specific interventions  Outcome: Progressing

## 2023-09-22 NOTE — OPERATIVE REPORT
[facility]  Operative Note         India Lima Location: OR   [facility] [id number] MRN [id number]   Admission Date 9/22/2023 Operation Date 9/22/2023   Attending Physician Shaheed Ren MD       Patient Name: India Lima     Preoperative Diagnosis: Prostate cancer [facility]) [C61]     Postoperative Diagnosis: Prostate cancer ([address] [place]     Procedure(s): Robotic assisted laparoscopic radical prostatectomy, bilateral pelvic lymph node dissection. Primary Surgeon: Brandon Royal MD     Surgical Assistant.: Ariane Godwin     Anesthesia: General     Specimen:   ID Type Source Tests Collected by Time Destination   1 : 1. yas prostatic fat Tissue Yas prostatic fat SURGICAL PATHOLOGY TISSUE Familia Duran MD 9/22/2023  9:20 AM    2 : 1. prostate, bilateral seminal vesicles, bilateral ampulla of vas Tissue Prostate SURGICAL PATHOLOGY TISSUE Familia Duran MD 9/22/2023  9:34 AM    3 : 3. left pelvic lymph nodes Tissue Lymph node pelvic left SURGICAL PATHOLOGY TISSUE Familia Duran MD 9/22/2023  9:35 AM    4 : 4. right pelvic lymph nodes Tissue Lymph node pelvic right SURGICAL PATHOLOGY TISSUE Familia Duran MD 9/22/2023  9:35 AM         Estimated Blood Loss: 50 mL. Complications: None. Indications for procedure: Patient is a pleasant 28-year-old male who was diagnosed with clinically localized intermediate risk prostate cancer upon prostate biopsy performed 7/14/2023 for evaluation of elevated screening PSA levels and a suspicious prostate MRI. Staging studies were negative for metastatic disease. Definitive local therapy options were discussed with the patient who elected to proceed with a robotic radical prostatectomy and pelvic lymph node dissection. The procedure was discussed in detail including rationale, approach, benefits, risks, possible complications, and reasonable alternatives of treatment.   We discussed surgical risks including but not limited to medical and anesthetic complications, bleeding, infection, damage to surrounding organs or structures, need for additional procedures, anastomotic leak, bladder neck contracture, urinary incontinence, erectile dysfunction. He verbalized understanding and wishes to proceed. He was preoperatively medically optimized and cleared for surgery by the medical consultant. Surgical Findings: No modification of the intended routine. Nerve-sparing robotic prostatectomy occurred. All gross potential disease was fully resected at the end of the procedure. Watertight vesicourethral anastomosis. Operative Summary:  The patient was brought to the Operating Room. Patient identification was reconfirmed prior to anesthesia in the Operating Room, and intravenous antibiotics were administered for infection prophylaxis. Bilateral lower extremity compression devices were placed for DVT prevention and general endotracheal anesthesia was obtained without difficulty. A standard universal time-out procedure was carried out after anesthesia induction and prior to starting the procedure. The patient was positioned supine on the operating table with the arms tucked and all pressure areas and bony prominences well padded. The patient was positioned to be stable in 23 degrees of Trendelenburg during surgery without excessive patient movement. The patient's lower abdomen and genitalia were shaved, prepped and draped in routine fashion, and the procedure was initiated. After standard draping, sterilely on the field a 20-Fr De catheter was placed to the level of the bladder and the bladder emptied. The procedure was initiated by establishing a pneumoperitoneum by passage of a Veress needle at the level above the umbilicus in the midline into the intraperitoneal cavity, with appropriate needle position confirmed clinically.  A pneumoperitoneum to 12 mm of mercury pressure was established without difficulty, with the patient showing no adverse hemodynamic or respiratory function change, and an initial trocar was placed in the midline superior to the umbilicus using a 8-mm robotic trocar. Through this initial trocar, intraabdominal laparoscopy was performed. INTRA-ABDOMINAL FINDINGS:  There were no significant intraabdominal abnormalities noted. No bleeding, bowel abnormality or injury, or other intraabdominal pathology recognized. Additional robotic and assistant trocars were subsequently placed in standard location across the lower abdomen, with safe placement and position visually confirmed. Once all 5 working ports were placed, the robot was brought to the field, docked and procedure was initiated. INITIATION OF PROCEDURE:  The procedure was started using a 0-degree lens scope, using the monopolar scissors in the near right lateral arm, the [place] bipolar in the near left lateral arm, and the ProGrasp in the far left lateral arm. Initial dissection was performed posterior to the bladder to isolate and mobilize the vas deferens and seminal vesicles in the midline beneath and below the bladder at the level of the cul-de-sac. The peritoneum was incised horizontally to reveal the vasa, which were isolated for approximately 2 cm proximal to the ampulla and divided with cautery. This was performed bilaterally with careful dissection ensuring appropriate recognition of the vasa. The seminal vesicles were then identified, mobilized, dissected and freed bilaterally using blunt and sharp dissection. With the vasa and seminal vesicles isolated and freed bilaterally, they were grasped for anterior retraction and a plane was established in the midline between the vasa and seminal vesicles and the rectum, and this dissection was continued distally along the rectum below Denonvilliers fascia, between the rectum and prostate.  Once adequate posterior midline dissection was achieved at the vasa and prostate base, attention was subsequently directed towards anterior dissection at the space of Retzius. The anterior peritoneum was incised lateral to the medial umbilical ligaments to enter into the extraperitoneal, extravesical space. The anterior peritoneum was incised laterally to the level of the internal ring, and subsequently dissection was continued into the pelvis parallel with the course of the vasa into the pelvis, incising the peritoneum in this manner to widely mobilize the superior extent of the bladder. With the space of Retzius entered, dissection was carried distally to the pubic symphysis through the avascular areolar tissues. The lateral dissection was sufficiently beyond the internal ring to leave a wide margin of anterior peritoneum to cover it if needed at the end of the procedure. With the pubic symphysis identified, gentle blunt dissection was utilized to fully expose the endopelvic fascia, which was defatted, and excellent visualization was obtained with the general position of the prostate and transition to the bladder appreciated. The outline of the prostate and the bladder neck junction were seen beneath the endopelvic fascia, and the fascia was subsequently incised immediately lateral to the prostate with the underlying levator muscles visualized. The levator ani muscles were swept off the prostate with good exposure of the lateral extent of the prostate and the prostatic apex, but limited apical dissection was performed at this time. The Dorsal venous complex was addressed at this point. It was identified, mobilized and dissected, then stapled and divided with excellent hemostasis using a 30-mm purple load of the EndoGIA stapler. At the mid-prostate and near the bladder neck junction, an anterior cystotomy site was localized.  The bladder was divided with cautery immediately proximal to the prostate with entry into the bladder first occurring in an anterior location, and once an anterior cystotomy was performed, the De catheter balloon was released and the tip of the De catheter was seen and brought through the cystotomy. Good visualization of the prostatic urethra through the cystotomy confirmed the appropriate location had been chosen for bladder neck transection, with the posterior bladder neck well visualized. Using the ProGrasp good anterior retraction of the the prostate was obtained with excellent visualization of the posterior bladder neck. No significant BPH or median prostate lobe was seen once the bladder was opened, and posterior dissection was continued per routine. At this point, dissection was continued to circumferentially separate the bladder from the prostate, although the degree of dissection with cautery at the lateral aspects and lateral edges of the bladder neck junction was carefully limited to minimize potential thermal injury to the more laterally located neurovascular bundle. Dissection was carried through the posterior bladder neck full thickness in the midline below the prostate base, and this allowed exposure and entry into the posterior space, where the vasa had been isolated to start the surgery. The vasa and the seminal vesicles were brought through the midline and retracted anteriorly, giving exposure to the lateral edges of perivesical tissue and to the more lateral prostate pedicles - these components were subsequently addressed. PROSTATE PEDICLE DISSECTION:  The tissue surrounding the prostate was clinically normal and routine antegrade neurovascular bundle preservation was performed bilaterally. Dissection was performed on the right side and careful attention was made to strictly avoid use of any thermal energy in the course of the lateral dissection, to optimally maintain the integrity of the further laterally located neurovascular bundle.  Sequential isolation and division of components of the prostatic pedicle was performed, with each small packet of tissue with vessels ligated with surgical clips, then sharply divided with the robotic gabino, again without use of any cautery. Dissection was performed on the left side and careful attention was made to strictly avoid use of any thermal energy in the course of the lateral dissection, to optimally maintain the integrity of the further laterally located neurovascular bundle. Sequential isolation and division of components of the prostatic pedicle was performed, with each small packet of tissue with vessels ligated with surgical clips, then sharply divided with the robotic gabino, again without use of any cautery. On completion of bilateral lateral dissection, excellent exposure of the posterior and lateral edges of the prostate was achieved distally to the urethra itself, beyond the prostate apex, with consistent nerve preservation at this level towards the pelvic floor as along the lateral prostate margin. With the posterior and lateral aspects of the dissection completed, attention was turned anteriorly for dissection of the prostatic apex. The anterior periprostatic dissection was limited to maintain the integrity of the anterior endopelvic fascia, with the puboprostatic ligaments not specifically divided. The urethra was fully isolated circumferentially and the prostate apex margin was well visualized and confirmed to be completely included on the specimen. At this point, the remaining attachment of the specimen was at the urethra, which was sharply divided, and an excellent urethral stump was achieved. The specimen was inspected intra-corporally and showed no abnormality or sign of cancer extension. It was entrapped in a surgical specimen bag.     LYMPH NODE DISSECTION:  Bilateral pelvic lymph node dissection was then undertaken using standard templates, with the external iliac vein being the lateral margin, Leonidas's ligament the distal margin, the obturator nerve and artery as the medial margin, and the proximal external iliac vein as the proximal margin. Identical templates for dissection were carried out bilaterally, first on the right and then on the left side, with specimens from the lymph node template grossly normal and removed from the field and submitted for permanent pathologic assessment. URETHRAL ANASTOMOSIS:  With the prostatectomy and lymph node dissection completed, attention was turned towards the urinary reconstruction and urethrovesical anastomosis. First, the bladder was brought lower into the pelvis using a 3-0 V-loc suture utilizing the Antonio technique, approximating the cut edge of the posterior peritoneum behind the bladder to the rectourethralis fascia, and this made the actual anastomosis tension-free. We confirmed good hemostasis. Double-armed #2-0 Kaitlyn Smallwood were placed about the six o'clock position of the posterior bladder neck and a continuous running anastomosis was constructed with each needle progressing in opposite directions towards the twelve o'clock point, where the ends were secured in a single knot. On completion of the anastomosis, there was a clear watertight closure, which was confirmed on irrigation and distension of the bladder with 120 cc of sterile water. A new 18-Fr De catheter was placed and passed easily into the bladder across the anastomosis; it was set to straight drainage with 12 cc placed in the De catheter balloon. The primary procedure was subsequently complete. COMPLETION OF PROCEDURE:  The 12 mm assistant port fascia was closed using an endoscopic fascial closure device and 0-vicryl suture under direct visualization. All trocars were removed under direct vision, with no evidence of hemorrhage or abnormality seen at any of the sites when visualized internally. Hemostasis in the pelvis was excellent on final inspection prior to trocar removal. The primary specimen was extracted through the supraumbilical site, which was enlarged to accommodate the size of the prostate.     The supraumbilical incision was closed primarily with running #0 Vicryl suture to approximate the anterior fascia. Subcutaneous tissues at the extraction site were approximated using interrupted #3-0 Vicryl. The remaining trocar incisions did not require primary fascial closure given their size and location. The overlying soft tissue at all incisions was well irrigated and infused with 0.5% Marcaine for postoperative analgesia. All incisions were subsequently closed only at the skin with absorbable #4 Monocryl suture and reinforced with Dermabond. The patient was extubated uneventfully in the Operating Room. He had stable vital signs throughout the surgery and was transferred to the PACU for routine monitoring having tolerated the procedure well without any immediate complications. All lap pad, instrument, needle, and sponge counts were correct x2 at the end of the procedure. Implants: * No implants in log *     Drains: 18-Fr urethral desai to straight drainage. 12 mL sterile water in the balloon. Condition: extubated and stable to PACU. I was present and either scrubbed or at the robotic console and performed the procedure in its entirety. At patient's request, findings were discussed with his wife following the procedure.       Teofilo Armstrong MD

## 2023-09-22 NOTE — H&P
History & Physical Examination    Patient Name: Glen Sutton  MRN: [id number]  [facility]: [id number]  YOB: 1955    Diagnosis: Clinically Localized Prostate Cancer. Present Illness: Patient is a pleasant 54-year-old male who was diagnosed with clinically localized intermediate risk prostate cancer upon prostate biopsy performed 7/14/2023 for evaluation of elevated screening PSA levels and a suspicious prostate MRI. Staging studies were negative for metastatic disease. Definitive local therapy options were discussed with the patient who elected to proceed with a robotic radical prostatectomy and pelvic lymph node dissection. The procedure was discussed in detail including rationale, approach, benefits, risks, possible complications, and reasonable alternatives of treatment. We discussed surgical risks including but not limited to medical and anesthetic complications, bleeding, infection, damage to surrounding organs or structures, need for additional procedures, anastomotic leak, bladder neck contracture, urinary incontinence, erectile dysfunction. He verbalized understanding and wishes to proceed. He was preoperatively medically optimized and cleared for surgery by the medical consultant. Today, he offers no new complaints. He denies fevers or chills, chest pain or shortness of breath.     Allergies: No Known Allergies    Past Medical History:   Diagnosis Date    Cancer ([facility] [address]     prostate    Cellulitis 01/01/2014    as per NG    Hemorrhoids     as per NG    High blood pressure     Lipid screening 02/13/2013    as per NG    Migraine 01/01/1985    as per NG    Visual impairment     glasses     Past Surgical History:   Procedure Laterality Date    ELECTROCARDIOGRAM, COMPLETE  09-    Scanned to Media Tab - 09-    HAND/FINGER SURGERY UNLISTED      as per NG    US BIOPSY PROSTATE Ledora Putty ([id number])  7/14/2023     Family History   Problem Relation Age of Onset    Heart Disease Father         CAD, as per NG    Heart Attack Father 68        Cause of death; as per NG    Lipids Father         as per NG    Heart Disease Mother         Swelling suggestive of Congestive Heart Failure; as per NG    Pulmonary Disease Sister 58        COPD; Cause of death; as per      Social History    Tobacco Use      Smoking status: Never      Smokeless tobacco: Never    Alcohol use: No      SYSTEM Check if Review is Normal Check if Physical Exam is Normal If not normal, please explain:   HEENT [X] [X]    NECK & BACK [X] [X]    HEART [X] [X]    LUNGS [X] [X]    ABDOMEN [X] [X]    UROGENITAL [X] [X]    EXTREMITIES [X] [X]    OTHER        [ x ] I have discussed the risks and benefits and alternatives with the patient/family. They understand and agree to proceed with plan of care. [ x ] I have reviewed the History and Physical done within the last 30 days. Any changes noted above.     Kathryn Friend MD  9/22/2023 7:29 AM

## 2023-09-22 NOTE — ANESTHESIA PROCEDURE NOTES
Airway  Date/Time: 9/22/2023 7:41 AM  Urgency: Elective      General Information and Staff    Patient location during procedure: OR  Anesthesiologist: Sharri Goldsmith MD  Resident/CRNA: Asa Dickerson CRNA  Performed: CRNA   Performed by: Asa Dickerson CRNA  Authorized by: Sharri Goldsmith MD      Indications and Patient Condition  Indications for airway management: anesthesia  Sedation level: deep  Preoxygenated: yes  Patient position: sniffing  Mask difficulty assessment: 1 - vent by mask    Final Airway Details  Final airway type: endotracheal airway      Successful airway: ETT  Cuffed: yes   Successful intubation technique: direct laryngoscopy  Endotracheal tube insertion site: oral  Blade: Mina  Blade size: #4  ETT size (mm): 8.0    Cormack-Lehane Classification: grade IIA - partial view of glottis  Placement verified by: capnometry   Measured from: lips  ETT to lips (cm): 22  Number of attempts at approach: 1    Additional Comments  Dental exam unchanged from pre op  Soft guaze bite block between molars

## 2023-09-22 NOTE — OR NURSING
Clemencia Ball RN updated Xavier Sanchezma (wife @[phone]) that the patient did well and Dr. Donna Casillas will speak to her after he is finished.

## 2023-09-22 NOTE — ANESTHESIA PROCEDURE NOTES
Peripheral IV  Date/Time: 9/22/2023 7:43 AM  Inserted by: Jacquie Osler, MD    Placement  Needle size: 18 G  Laterality: right  Location: hand  Site prep: alcohol  Technique: anatomical landmarks  Attempts: 1

## 2023-09-23 ENCOUNTER — TELEPHONE (OUTPATIENT)
Dept: SURGERY | Facility: CLINIC | Age: 68
End: 2023-09-23

## 2023-09-23 VITALS
SYSTOLIC BLOOD PRESSURE: 126 MMHG | TEMPERATURE: 98 F | HEIGHT: 72 IN | RESPIRATION RATE: 18 BRPM | OXYGEN SATURATION: 92 % | HEART RATE: 64 BPM | WEIGHT: 283 LBS | BODY MASS INDEX: 38.33 KG/M2 | DIASTOLIC BLOOD PRESSURE: 76 MMHG

## 2023-09-23 LAB
ANION GAP SERPL CALC-SCNC: 5 MMOL/L (ref 0–18)
BASOPHILS # BLD AUTO: 0.02 X10(3) UL (ref 0–0.2)
BASOPHILS NFR BLD AUTO: 0.1 %
BUN BLD-MCNC: 22 MG/DL (ref 7–18)
BUN/CREAT SERPL: 22.4 (ref 10–20)
CALCIUM BLD-MCNC: 8 MG/DL (ref 8.5–10.1)
CHLORIDE SERPL-SCNC: 112 MMOL/L (ref 98–112)
CO2 SERPL-SCNC: 25 MMOL/L (ref 21–32)
CREAT BLD-MCNC: 0.98 MG/DL
DEPRECATED RDW RBC AUTO: 47 FL (ref 35.1–46.3)
EGFRCR SERPLBLD CKD-EPI 2021: 84 ML/MIN/1.73M2 (ref 60–?)
EOSINOPHIL # BLD AUTO: 0 X10(3) UL (ref 0–0.7)
EOSINOPHIL NFR BLD AUTO: 0 %
ERYTHROCYTE [DISTWIDTH] IN BLOOD BY AUTOMATED COUNT: 13.3 % (ref 11–15)
GLUCOSE BLD-MCNC: 126 MG/DL (ref 70–99)
HCT VFR BLD AUTO: 39.7 %
HGB BLD-MCNC: 12.9 G/DL
IMM GRANULOCYTES # BLD AUTO: 0.08 X10(3) UL (ref 0–1)
IMM GRANULOCYTES NFR BLD: 0.5 %
LYMPHOCYTES # BLD AUTO: 0.87 X10(3) UL (ref 1–4)
LYMPHOCYTES NFR BLD AUTO: 5.6 %
MCH RBC QN AUTO: 31 PG (ref 26–34)
MCHC RBC AUTO-ENTMCNC: 32.5 G/DL (ref 31–37)
MCV RBC AUTO: 95.4 FL
MONOCYTES # BLD AUTO: 1.28 X10(3) UL (ref 0.1–1)
MONOCYTES NFR BLD AUTO: 8.3 %
NEUTROPHILS # BLD AUTO: 13.23 X10 (3) UL (ref 1.5–7.7)
NEUTROPHILS # BLD AUTO: 13.23 X10(3) UL (ref 1.5–7.7)
NEUTROPHILS NFR BLD AUTO: 85.5 %
OSMOLALITY SERPL CALC.SUM OF ELEC: 299 MOSM/KG (ref 275–295)
PLATELET # BLD AUTO: 213 10(3)UL (ref 150–450)
POTASSIUM SERPL-SCNC: 4 MMOL/L (ref 3.5–5.1)
RBC # BLD AUTO: 4.16 X10(6)UL
SODIUM SERPL-SCNC: 142 MMOL/L (ref 136–145)
WBC # BLD AUTO: 15.5 X10(3) UL (ref 4–11)

## 2023-09-23 PROCEDURE — 99214 OFFICE O/P EST MOD 30 MIN: CPT | Performed by: HOSPITALIST

## 2023-09-23 RX ORDER — HYDROCODONE BITARTRATE AND ACETAMINOPHEN 5; 325 MG/1; MG/1
1-2 TABLET ORAL EVERY 6 HOURS PRN
Qty: 30 TABLET | Refills: 0 | Status: SHIPPED | OUTPATIENT
Start: 2023-09-23

## 2023-09-23 RX ORDER — SENNA AND DOCUSATE SODIUM 50; 8.6 MG/1; MG/1
2 TABLET, FILM COATED ORAL NIGHTLY PRN
Qty: 14 TABLET | Refills: 0 | Status: SHIPPED | OUTPATIENT
Start: 2023-09-23

## 2023-09-23 NOTE — PLAN OF CARE
Smith Chavira is A&Ox4. Pain managed with scheduled ofirmev. Tolerating clears no orders to advance. De cath to gravity, output monitored. IVF continuous. Surgical Incisions clean and intact. Trent light in reach. Plan of care continued as ordered.     Problem: Patient Centered Care  Goal: Patient preferences are identified and integrated in the patient's plan of care  Description: Interventions:  - What would you like us to know as we care for you?   - Provide timely, complete, and accurate information to patient/family  - Incorporate patient and family knowledge, values, beliefs, and cultural backgrounds into the planning and delivery of care  - Encourage patient/family to participate in care and decision-making at the level they choose  - Honor patient and family perspectives and choices  Outcome: Progressing     Problem: Patient/Family Goals  Goal: Patient/Family Long Term Goal  Description: Patient's Long Term Goal:     Interventions:  -   - See additional Care Plan goals for specific interventions  Outcome: Progressing  Goal: Patient/Family Short Term Goal  Description: Patient's Short Term Goal:     Interventions:   -   - See additional Care Plan goals for specific interventions  Outcome: Progressing

## 2023-09-23 NOTE — TELEPHONE ENCOUNTER
S/p RALP 9/22/23.  Needs visit with [facility] 10/2 for postop and desai removal.     Jack Michelle MD  9/23/2023

## 2023-09-23 NOTE — DISCHARGE SUMMARY
[facility]    Discharge Summary    [place] Patient Status:  Outpatient in a Bed    1955 MRN [id number]   Location [facility] 4W/SW/SE Attending Bello Kang MD   Hosp Day # 0 PCP Kat Hull MD     Date of Admission: 2023 Disposition: Home or Self Care     Date of Discharge: 23      Admitting Diagnosis: Prostate cancer [facility]) Aiyana Manjarrez  Prostate cancer [facility])    Hospital Discharge Diagnoses:  Prostate Cancer    Lace+ Score: 50  59-90 High Risk  29-58 Medium Risk  0-28   Low Risk. TCM Follow-Up Recommendation:  LACE 29-58: Moderate Risk of readmission after discharge from the hospital.      Problem List: Patient Active Problem List:     Abnormal glucose     Elevated blood pressure reading     Overweight     Elevated PSA     Acquired deformity of finger of right hand     Prostate cancer [facility])     Essential hypertension      Reason for Admission:       Physical Exam:   General appearance: alert, appears stated age and cooperative  Pulmonary:  clear to auscultation bilaterally  Cardiovascular: S1, S2 normal, no murmur, click, rub or gallop, regular rate and rhythm  Abdominal: soft, non-tender; bowel sounds normal; no masses,  no organomegaly  Extremities: extremities normal, atraumatic, no cyanosis or edema  Psychiatric: calm      History of Present Illness:   Per Dr. Milka Velarde  Patient is a pleasant 60-year-old male who was diagnosed with clinically localized intermediate risk prostate cancer upon prostate biopsy performed 2023 for evaluation of elevated screening PSA levels and a suspicious prostate MRI. Staging studies were negative for metastatic disease. Definitive local therapy options were discussed with the patient who elected to proceed with a robotic radical prostatectomy and pelvic lymph node dissection.   The procedure was discussed in detail including rationale, approach, benefits, risks, possible complications, and reasonable alternatives of treatment. We discussed surgical risks including but not limited to medical and anesthetic complications, bleeding, infection, damage to surrounding organs or structures, need for additional procedures, anastomotic leak, bladder neck contracture, urinary incontinence, erectile dysfunction. He verbalized understanding and wishes to proceed. He was preoperatively medically optimized and cleared for surgery by the medical consultant. Today, he offers no new complaints. He denies fevers or chills, chest pain or shortness of breath. Hospital Course:   Prostate cancer ([address]  S/P XI-Robotic assisted laparoscopic radical prostatectomy, bilateral pelvic lymph node dissection  pain control-->norco on dc  Hemodynamic status stable  DVT prophylaxis received hep subcutaneous while hospitalized  Pathology pending. --follow-up with Dr. Gene Hayden for results  Home with desai     Essential hypertension  Cont home meds  BP stable. Consultations:   Urology    Procedures: S/P XI-Robotic assisted laparoscopic radical prostatectomy, bilateral pelvic lymph node dissection    Complications: n/a    Discharge Condition: Good    Discharge Medications:      Discharge Medications        START taking these medications        Instructions Prescription details   HYDROcodone-acetaminophen 5-325 MG Tabs  Commonly known as: Norco      Take 1-2 tablets by mouth every 6 (six) hours as needed for Pain. Quantity: 30 tablet  Refills: 0     senna-docusate 8.6-50 MG Tabs  Commonly known as: Senokot-S      Take 2 tablets by mouth nightly as needed for constipation. Quantity: 14 tablet  Refills: 0            CONTINUE taking these medications        Instructions Prescription details   amLODIPine 5 MG Tabs  Commonly known as: Norvasc      Take 1 tablet (5 mg total) by mouth daily. Quantity: 30 tablet  Refills: 11               Where to Get Your Medications        [id number] [place] [facility] [address]  [facility], [phone], [address] [place] [address] [place], [place] [address]      Hours: 24-hours Phone: [phone]   HYDROcodone-acetaminophen 5-325 MG Tabs  senna-docusate 8.6-50 MG Tabs         Follow up Visits:  Follow-up with pcp as need     Follow up Labs: n/a      Other Discharge Instructions: follow-up with urology as instructed    Christiano Banuelos MD  9/23/2023  10:26 AM    > 35 min

## 2023-09-23 NOTE — PLAN OF CARE
VSS, afebrile. De to gravity, strict I&Os. IVF infusing. Oxycodone prn for pain. Tolerated diet, no nausea. Ambulated in hallways with family. Passed small amt of flatus. Changed to leg bag per patient request. Sent home with supplies for catheter care. Taught Wife and Patient Leg bag care, educated on De infection control procedures. Wife verbalized understanding. Safety plan in place. Frequent rounding completed. Discharged home with wife. Problem: Patient Centered Care  Goal: Patient preferences are identified and integrated in the patient's plan of care  Description: Interventions:  - What would you like us to know as we care for you?  I am a Printer  - Provide timely, complete, and accurate information to patient/family  - Incorporate patient and family knowledge, values, beliefs, and cultural backgrounds into the planning and delivery of care  - Encourage patient/family to participate in care and decision-making at the level they choose  - Honor patient and family perspectives and choices  Outcome: Progressing     Problem: Patient/Family Goals  Goal: Patient/Family Short Term Goal  Description: Patient's Short Term Goal: No complications with surgery    Monitor pain  Monitor VS  Monitor labs  administer medications  Keep patient updated on plan of care  Attentive listening     - See additional Care Plan goals for specific interventions  Outcome: Progressing     Problem: GASTROINTESTINAL - ADULT  Goal: Maintains or returns to baseline bowel function  Description: INTERVENTIONS:  - Assess bowel function  - Maintain adequate hydration with IV or PO as ordered and tolerated  - Evaluate effectiveness of GI medications  - Encourage mobilization and activity  - Obtain nutritional consult as needed  - Establish a toileting routine/schedule  - Consider collaborating with pharmacy to review patient's medication profile  Outcome: Progressing     Problem: PAIN - ADULT  Goal: Verbalizes/displays adequate comfort level or patient's stated pain goal  Description: INTERVENTIONS:  - Encourage pt to monitor pain and request assistance  - Assess pain using appropriate pain scale  - Administer analgesics based on type and severity of pain and evaluate response  - Implement non-pharmacological measures as appropriate and evaluate response  - Consider cultural and social influences on pain and pain management  - Manage/alleviate anxiety  - Utilize distraction and/or relaxation techniques  - Monitor for opioid side effects  - Notify MD/LIP if interventions unsuccessful or patient reports new pain  - Anticipate increased pain with activity and pre-medicate as appropriate  Outcome: Progressing     Problem: SAFETY ADULT - FALL  Goal: Free from fall injury  Description: INTERVENTIONS:  - Assess pt frequently for physical needs  - Identify cognitive and physical deficits and behaviors that affect risk of falls.   - [place] fall precautions as indicated by assessment.  - Educate pt/family on patient safety including physical limitations  - Instruct pt to call for assistance with activity based on assessment  - Modify environment to reduce risk of injury  - Provide assistive devices as appropriate  - Consider OT/PT consult to assist with strengthening/mobility  - Encourage toileting schedule  Outcome: Progressing     Problem: DISCHARGE PLANNING  Goal: Discharge to home or other facility with appropriate resources  Description: INTERVENTIONS:  - Identify barriers to discharge w/pt and caregiver  - Include patient/family/discharge partner in discharge planning  - Arrange for needed discharge resources and transportation as appropriate  - Identify discharge learning needs (meds, wound care, etc)  - Arrange for interpreters to assist at discharge as needed  - Consider post-discharge preferences of patient/family/discharge partner  - Complete POLST form as appropriate  - Assess patient's ability to be responsible for managing their own health  - Refer to Case Management Department for coordinating discharge planning if the patient needs post-hospital services based on physician/LIP order or complex needs related to functional status, cognitive ability or social support system  Outcome: Progressing

## 2023-10-02 ENCOUNTER — OFFICE VISIT (OUTPATIENT)
Dept: SURGERY | Facility: CLINIC | Age: 68
End: 2023-10-02

## 2023-10-02 DIAGNOSIS — C61 PROSTATE CANCER (HCC): Primary | ICD-10-CM

## 2023-10-02 PROCEDURE — 1159F MED LIST DOCD IN RCRD: CPT | Performed by: NURSE PRACTITIONER

## 2023-10-02 PROCEDURE — 99024 POSTOP FOLLOW-UP VISIT: CPT | Performed by: NURSE PRACTITIONER

## 2023-10-02 PROCEDURE — 1160F RVW MEDS BY RX/DR IN RCRD: CPT | Performed by: NURSE PRACTITIONER

## 2023-10-02 PROCEDURE — 1111F DSCHRG MED/CURRENT MED MERGE: CPT | Performed by: NURSE PRACTITIONER

## 2023-10-02 NOTE — PROGRESS NOTES
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 Accompanied by his wife. INTERVAL HISTORY: Seen for elevated PSA levels, up to 4.65 ng/mL 2/2023. Repeat PSA 4/2023 remained elevated at 4.3 ng/mL. Prostate MRI 6/2023 revealed a PI-RADS 4 lesion in the right PZ mid gland, suspicious for clinically significant prostate cancer. Patient underwent a UroNav fusion prostate biopsy 7/14/2023. Pathology revealing intermediate risk prostate cancer. Staging NM bone scan and CT A/P completed and did not show any evidence of metastatic disease. He is now s/p RALP + PLND on 9/23/23. Pathology revealing bilateral acinar prostatic adenocarcinoma, ángel score 3+4=7 with territory ángel pattern 5. No EPE or LVI.  + PNI. Negative margins. 2 lymph nodes negative. Doing well. Reports minimal pain, no longer taking medication. Tolerating regular diet with no nausea. Reports regular bowel movements. Ambulating without difficulty. Reports bilateral lower extremity edema, no calf pain. 1. Clinically Localized Intermediate Risk Prostate Cancer (cT1c cN0 cM0)  No family history of prostate cancer. Routine prostate cancer screening performed by patient's PCP. His PSA from 2/9/2023 was noted to be elevated to 4.65 ng/mL. This is increased from 2.49 ng/mL in 5/2019. Repeat PSA 4/4/2023 remained elevated at 4.3 ng/mL. Patient denies any significant LUTS. He denies gross hematuria or dysuria. No sensation of incomplete bladder emptying, straining to urinate, or weak stream.     Denies any significant erectile dysfunction. No bone pain, fevers or chills, chest pain or shortness of breath. Prostate MRI 6/2023 revealed a PI-RADS 4 lesion in the right PZ mid gland, suspicious for clinically significant prostate cancer.      Patient underwent a Liliane Burrs fusion prostate biopsy 7/14/2023 which revealed:  - Prostate volume of TRUS: 29.5 cc.  - PSA density: 0.15 ng/mL/cc  - GG 2 (Dave 3+4=7) Prostate AdenoCa in 3/3 cores from MANDEEP, 30% involvement, + PNI.  - GG 2 ([place] 3+4=7) PCa in 2/12 sextant cores (RM), 25% involvement, + PNI.  - GG 1 (Dave 3+3=6) PCa in 3/12 sextant cores (RB, RA), up to 5% involvement, + PNI. Staging nuclear medicine bone scan and CT of the abdomen and pelvis did not reveal any evidence of metastatic disease. 9/22/23 s/p RALP + PLND    10/2/23 F/U: doing well. Pathology discussed with patient and spouse. Desai catheter removed. Instructed on kegel exercises. PSA in 6 weeks with a follow up thereafter. PAST MEDICAL HISTORY: Hypertension (not on medication). Prostate cancer 7/2023     PAST SURGICAL HISTORY: Hand surgery. SOCIAL HISTORY:  and has 3 grownup children. No smoking or illicit drug use. Rare social alcohol. Works in [employer]. Reviewed past medical, surgical, family, and social history. Reviewed med list and allergies. REVIEW OF SYSTEMS:  Pertinent positives and negatives per HPI. A 12-point ROS was performed and is otherwise negative. EXAM:  There were no vitals taken for this visit. Physical Exam  Constitutional:       Appearance: He is well-developed. HENT:      Head: Normocephalic. Eyes:      Conjunctiva/sclera: Conjunctivae normal.   Cardiovascular:      Rate and Rhythm: Normal rate. Pulmonary:      Effort: Pulmonary effort is normal. No respiratory distress. Abdominal:      General: There is no distension. Palpations: Abdomen is soft. Tenderness: There is no abdominal tenderness. Comments: Lap sites with skin glue well approximated, no erythema or discharge. Appears to be healing appropriately. Genitourinary:     Comments: 18 fr desai catheter draining clear yellow urine. Musculoskeletal:      Cervical back: Normal range of motion. Skin:     General: Skin is warm and dry. Neurological:      Mental Status: He is alert and oriented to person, place, and time. Psychiatric:         Mood and Affect: Mood normal.         Behavior: Behavior normal.         Thought Content: Thought content normal.         Judgment: Judgment normal.       PATHOLOGY:    Authorizing Provider:  Jia Shay MD       Collected:           09/22/2023 09:20 AM           Ordering Location:     [facility]          Received:            09/22/2023 01:44 PM                                  Operating Room                                                               Pathologist:           Alphonse Barrett MD                                                             Specimens:   A) - Yas prostatic fat, 1. yas prostatic fat                                                      B) - Prostate, 1. prostate, bilateral seminel vesicles, bilateral ampulla of vas                    C) - Lymph node pelvic left, 3. left pelvic lymph nodes                                              D) - Lymph node pelvic right, 4. right pelvic lymph nodes                                  Final Diagnosis:      A. Periprostatic fat; excision:   Fibroadipose tissue with vascular congestion. No evidence of malignancy is seen. B. Prostate, bilateral seminal vesicles and bilateral ampulla of vas; robotic assisted radical prostatectomy:   Bilateral acinar prostatic adenocarcinoma, [place] score 3+4 = 7, (grade group 2), (15% Dave 4 pattern) with territory Dave pattern 5 (less than 5%). Dominant tumor nodule measures 2.1 cm in maximum dimension, (right posterior region). Tumor is involving approximately 8% of the entire prostatic tissue. No evidence of extra-prostatic tumor extension into the periprostatic fibroadipose tissue is identified. All inked external specimen margins are free of tumor, with tumor showing a closest approach of 0.5 mm from the inked specimen margin in the right posterior region.   Multifocal intraprostatic tumor perineural invasion present. No evidence of tumor lymphovascular invasion identified. Prostatic parenchyma uninvolved by tumor demonstrates glandular and stromal hyperplasia, and multifocal mild chronic prostatitis. Bilateral seminal vesicles and vasa deferentia are negative for malignancy. Pathologic TNM: pT2, N0 [See Prostate Cancer Case Summary]. C. Left pelvic lymph nodes; dissection:   Two benign reactive lymph nodes with fatty changes (0/2). No evidence of metastatic carcinoma is seen. D. Right pelvic lymph nodes; dissection:   Fibroadipose tissue with hemorrhage and vascular congestion. No lymphoid tissue is identified. Pathology                                Case: [id number]       Provider:  Mitchael Dandy, MD       Collected:           07/14/2023                 Location [place] Score % of Pattern 4  Grade Group   Positive Cores / Total Cores  Tumor Length (mm)  % Tissue Involved       A. Region of interest 3+4 10 2 3/3 20 30    B. Left base - -- -- -- -- -    C. Left mid - - - - - -    D. Left apex - - - - - -    E. Right base 3+3 - 1 2/2 4 5    F. Right mid 3+4 10 2 2/2 12 25    G. Right apex 3+3 - 1 1/2 3 3       LABS:      PSA   Latest Ref Rng <=4.00 ng/mL   8/19/2013 1.2    8/20/2014 1.3    3/16/2016 1.5    1/8/2018 2.6    5/23/2019 2.49    2/9/2023 4.65 (H)   4/4/2023 4.30 (H)        IMAGING:    NM BONE SCAN WB (8/2/2023): No evidence for metastatic disease to the bone. CT ABDOMEN+PELVIS (8/2/2023): 1. No metastatic disease is identified within the abdomen or pelvis. No adenopathy is seen within the abdomen or pelvis. 2. There is a dermal nodule within the cutaneous fat at the right paracentral back posteriorly at the level of L1. Clinical correlation with physical exam is suggested. UROLOGY PROCEDURE:  None performed today.        IMPRESSION:  76year old male who is s/p RALP + PLND on 9/22/23    Final pathology results discussed with patient and accompanying wife. They were handed a copy of the report. Discussed the significance of  adverse findings when/if identified on final pathology. Will await post prostatectomy PSA levels in 6 weeks to guide need for further treatment. Discussed prostate cancer surveillance following definitive local treatment per NCCN guidelines. De catheter removed after deflating balloon. We discussed the expected postoperative course of recovery of sexual function and urinary continence. Patient instructed on the importance of performing Kegel exercises in the recovery of urinary control. A printed handout was provided with instructions on performing pelvic floor strengthening exercises. All questions answered. PLAN:  1. Kegel exercises    2. Activity restrictions for 4 weeks    Follow up in 6 weeks with a PSA Prior to that appointment.

## 2023-10-23 ENCOUNTER — OFFICE VISIT (OUTPATIENT)
Dept: INTERNAL MEDICINE CLINIC | Facility: CLINIC | Age: 68
End: 2023-10-23

## 2023-10-23 ENCOUNTER — HOSPITAL ENCOUNTER (OUTPATIENT)
Dept: ULTRASOUND IMAGING | Facility: HOSPITAL | Age: 68
Discharge: HOME | End: 2023-10-23
Attending: INTERNAL MEDICINE

## 2023-10-23 ENCOUNTER — LAB ENCOUNTER (OUTPATIENT)
Dept: LAB | Age: 68
End: 2023-10-23
Attending: NURSE PRACTITIONER

## 2023-10-23 ENCOUNTER — TELEPHONE (OUTPATIENT)
Dept: INTERNAL MEDICINE CLINIC | Facility: CLINIC | Age: 68
End: 2023-10-23

## 2023-10-23 VITALS
WEIGHT: 289 LBS | HEIGHT: 72 IN | OXYGEN SATURATION: 97 % | DIASTOLIC BLOOD PRESSURE: 80 MMHG | SYSTOLIC BLOOD PRESSURE: 136 MMHG | BODY MASS INDEX: 39.14 KG/M2 | TEMPERATURE: 99 F | HEART RATE: 99 BPM

## 2023-10-23 DIAGNOSIS — M79.89 PAIN AND SWELLING OF LOWER EXTREMITY, UNSPECIFIED LATERALITY: ICD-10-CM

## 2023-10-23 DIAGNOSIS — Z00.00 ROUTINE HEALTH MAINTENANCE: ICD-10-CM

## 2023-10-23 DIAGNOSIS — C61 PROSTATE CANCER (HCC): ICD-10-CM

## 2023-10-23 DIAGNOSIS — E78.5 HYPERLIPIDEMIA, UNSPECIFIED HYPERLIPIDEMIA TYPE: ICD-10-CM

## 2023-10-23 DIAGNOSIS — R60.0 LOWER EXTREMITY EDEMA: ICD-10-CM

## 2023-10-23 DIAGNOSIS — M79.606 PAIN AND SWELLING OF LOWER EXTREMITY, UNSPECIFIED LATERALITY: ICD-10-CM

## 2023-10-23 DIAGNOSIS — I10 PRIMARY HYPERTENSION: Primary | ICD-10-CM

## 2023-10-23 DIAGNOSIS — Z90.79 HISTORY OF PROSTATECTOMY: ICD-10-CM

## 2023-10-23 LAB — PSA SERPL-MCNC: <0.01 NG/ML (ref ?–4)

## 2023-10-23 PROCEDURE — 84153 ASSAY OF PSA TOTAL: CPT

## 2023-10-23 PROCEDURE — 36415 COLL VENOUS BLD VENIPUNCTURE: CPT

## 2023-10-23 PROCEDURE — 93970 EXTREMITY STUDY: CPT | Performed by: INTERNAL MEDICINE

## 2023-10-23 NOTE — TELEPHONE ENCOUNTER
Please let Say Bonilla know that no blood clots were found. He may already know this. We can use the knee-high support hose that we spoke about during office visit. I will see him November 28 in follow-up. If he wants to make any changes prior to next appointment please tell him he is free to call me and we can make changes to try and help his lower extremity edema.

## 2023-10-26 ENCOUNTER — OFFICE VISIT (OUTPATIENT)
Dept: SURGERY | Facility: CLINIC | Age: 68
End: 2023-10-26

## 2023-10-26 DIAGNOSIS — N39.3 MALE STRESS INCONTINENCE: ICD-10-CM

## 2023-10-26 DIAGNOSIS — C61 PROSTATE CANCER (HCC): Primary | ICD-10-CM

## 2023-10-26 PROCEDURE — 1159F MED LIST DOCD IN RCRD: CPT | Performed by: UROLOGY

## 2023-10-26 NOTE — PROGRESS NOTES
[facility]  Follow-Up Visit    HPI: Renny Smith is a 76year old male presents for a follow up visit. Last seen 10/2/2023. Accompanied by his wife. INTERVAL HISTORY: Prostate cancer, status post RALP + PLND 9/23/23. Final pathology revealing bilateral acinar prostatic adenocarcinoma, ángel score 3+4=7 with tertiary ángel pattern 5. No EPE, SVI or LVI.  + PNI. Negative margins. 2 lymph nodes negative. pT2 N0 R0    Post-prostatectomy PSA level from 10/23/2023 undetectable at <0.01 ng/mL. Doing well. No pain. No fevers or chills. Good stream.  No straining to urinate or gross hematuria. Doing Kegel exercises at home. Using 1-2 pads per day that are pretty saturated. Minimal incontinence during the night. No erections since surgery. 1. Clinically Localized Intermediate Risk Prostate Cancer (pT2 pN0 cM0 R0)  No family history of prostate cancer. Routine prostate cancer screening performed by patient's PCP. His PSA from 2/9/2023 was noted to be elevated to 4.65 ng/mL. This is increased from 2.49 ng/mL in 5/2019. Repeat PSA 4/4/2023 remained elevated at 4.3 ng/mL. Patient denies any significant LUTS. He denies gross hematuria or dysuria. No sensation of incomplete bladder emptying, straining to urinate, or weak stream.     Denies any significant erectile dysfunction. No bone pain, fevers or chills, chest pain or shortness of breath. Prostate MRI 6/2023 revealed a PI-RADS 4 lesion in the right PZ mid gland, suspicious for clinically significant prostate cancer.      Patient underwent a Adela Mahajan fusion prostate biopsy 7/14/2023 which revealed:  - Prostate volume of TRUS: 29.5 cc.  - PSA density: 0.15 ng/mL/cc  - GG 2 ([place] 3+4=7) Prostate AdenoCa in 3/3 cores from MANDEEP, 30% involvement, + PNI.  - GG 2 (Ángel 3+4=7) PCa in 2/12 sextant cores (RM), 25% involvement, + PNI.  - GG 1 (Ángel 3+3=6) PCa in 3/12 sextant cores (RB, RA), up to 5% involvement, + PNI. Staging nuclear medicine bone scan and CT of the abdomen and pelvis did not reveal any evidence of metastatic disease. 9/22/23 s/p RALP + PLND    Final pathology revealing bilateral acinar prostatic adenocarcinoma, ángel score 3+4=7 with tertiary ángel pattern 5. No EPE, SVI or LVI.  + PNI. Negative margins. 2 lymph nodes negative. pT2 N0 R0    - 10/26/23 F/U: PSA undetectable. 1-2 saturated pads per day. No erections. Strong stream.  Start PFPT. PAST MEDICAL HISTORY: Hypertension (not on medication). Prostate cancer 7/2023     PAST SURGICAL HISTORY: Hand surgery. RALP + PLND 9/2023. SOCIAL HISTORY:  and has 3 grownup children. No smoking or illicit drug use. Rare social alcohol. Works in [employer]. Reviewed past medical, surgical, family, and social history. Reviewed med list and allergies. REVIEW OF SYSTEMS:  Pertinent positives and negatives per HPI. A 12-point ROS was performed and is otherwise negative. EXAM:  There were no vitals taken for this visit. Physical Exam  Constitutional:       General: He is not in acute distress. Appearance: He is well-developed. HENT:      Head: Normocephalic. Eyes:      General: No scleral icterus. Cardiovascular:      Rate and Rhythm: Normal rate. Pulmonary:      Effort: Pulmonary effort is normal. No respiratory distress. Musculoskeletal:      Cervical back: Normal range of motion. Skin:     General: Skin is warm and dry. Neurological:      Mental Status: He is alert and oriented to person, place, and time. Psychiatric:         Mood and Affect: Mood normal.         Behavior: Behavior normal.         Thought Content:  Thought content normal.         Judgment: Judgment normal.       PATHOLOGY:    Authorizing Provider:  Paul Sneed MD       Collected:           09/22/2023 09:20 AM           Ordering Location[facility]     [facility]          Received:            09/22/2023 01:44 PM Operating Room                                                               Pathologist:           Pedro Ramirez MD                                                             Specimens:   A) - Yas prostatic fat, 1. yas prostatic fat                                                      B) - Prostate, 1. prostate, bilateral seminel vesicles, bilateral ampulla of vas                    C) - Lymph node pelvic left, 3. left pelvic lymph nodes                                              D) - Lymph node pelvic right, 4. right pelvic lymph nodes                                  Final Diagnosis:      A. Periprostatic fat; excision:   Fibroadipose tissue with vascular congestion. No evidence of malignancy is seen. B. Prostate, bilateral seminal vesicles and bilateral ampulla of vas; robotic assisted radical prostatectomy:   Bilateral acinar prostatic adenocarcinoma, [place] score 3+4 = 7, (grade group 2), (15% Dave 4 pattern) with territory Dave pattern 5 (less than 5%). Dominant tumor nodule measures 2.1 cm in maximum dimension, (right posterior region). Tumor is involving approximately 8% of the entire prostatic tissue. No evidence of extra-prostatic tumor extension into the periprostatic fibroadipose tissue is identified. All inked external specimen margins are free of tumor, with tumor showing a closest approach of 0.5 mm from the inked specimen margin in the right posterior region. Multifocal intraprostatic tumor perineural invasion present. No evidence of tumor lymphovascular invasion identified. Prostatic parenchyma uninvolved by tumor demonstrates glandular and stromal hyperplasia, and multifocal mild chronic prostatitis. Bilateral seminal vesicles and vasa deferentia are negative for malignancy. Pathologic TNM: pT2, N0 [See Prostate Cancer Case Summary].        C. Left pelvic lymph nodes; dissection:   Two benign reactive lymph nodes with fatty changes (0/2). No evidence of metastatic carcinoma is seen. D. Right pelvic lymph nodes; dissection:   Fibroadipose tissue with hemorrhage and vascular congestion. No lymphoid tissue is identified. Pathology                                Case: [id number]       Provider:  Isrrael Guerra MD       Collected:           07/14/2023                 Location Dave Score % of Pattern 4  Grade Group   Positive Cores / Total Cores  Tumor Length (mm)  % Tissue Involved       A. Region of interest 3+4 10 2 3/3 20 30    B. Left base - -- -- -- -- -    C. Left mid - - - - - -    D. Left apex - - - - - -    E. Right base 3+3 - 1 2/2 4 5    F. Right mid 3+4 10 2 2/2 12 25    G. Right apex 3+3 - 1 1/2 3 3       LABS:      PSA   Latest Ref Rng <=4.00 ng/mL   8/19/2013 1.2    8/20/2014 1.3    3/16/2016 1.5    1/8/2018 2.6    5/23/2019 2.49    2/9/2023 4.65 (H)   4/4/2023 4.30 (H)    10/23/2023 < 0.01       IMAGING:    NM BONE SCAN WB (8/2/2023): No evidence for metastatic disease to the bone. CT ABDOMEN+PELVIS (8/2/2023): 1. No metastatic disease is identified within the abdomen or pelvis. No adenopathy is seen within the abdomen or pelvis. 2. There is a dermal nodule within the cutaneous fat at the right paracentral back posteriorly at the level of L1. Clinical correlation with physical exam is suggested. UROLOGY PROCEDURE:  None performed today. IMPRESSION:  76year old male who is s/p RALP + PLND on 9/22/23    Undetectable PSA levels following his radical prostatectomy. Results reviewed with patient and wife. Results are reassuring. No indication for any adjuvant therapy at this point. Discussed prostate cancer surveillance following definitive local treatment per NCCN guidelines. Patient making appropriate recovery regarding his urinary continence following surgery thus far. Recommend pelvic floor physical therapy and continuing Kegel exercises at home.       Discussed post-prostatectomy erectile dysfunction. Management options reviewed. Patient would like to wait for the time being. All questions answered. PLAN:  1. Continue Kegel exercises    2. Referral for pelvic floor physical therapy. 3.  Prostate cancer surveillance per guidelines. 4.  Patient would like to hold off on further treatment of ED at this point. RTC for follow-up in 6 months with a PSA prior to office visit.       Jose Guadalupe Samano MD  10/26/2023

## 2023-11-28 ENCOUNTER — OFFICE VISIT (OUTPATIENT)
Dept: INTERNAL MEDICINE CLINIC | Facility: CLINIC | Age: 68
End: 2023-11-28

## 2023-11-28 VITALS
RESPIRATION RATE: 16 BRPM | DIASTOLIC BLOOD PRESSURE: 74 MMHG | SYSTOLIC BLOOD PRESSURE: 130 MMHG | BODY MASS INDEX: 39.42 KG/M2 | WEIGHT: 291 LBS | HEART RATE: 90 BPM | OXYGEN SATURATION: 98 % | TEMPERATURE: 98 F | HEIGHT: 72 IN

## 2023-11-28 DIAGNOSIS — C61 PROSTATE CANCER (HCC): ICD-10-CM

## 2023-11-28 DIAGNOSIS — Z00.00 ROUTINE HEALTH MAINTENANCE: ICD-10-CM

## 2023-11-28 DIAGNOSIS — I10 PRIMARY HYPERTENSION: Primary | ICD-10-CM

## 2023-11-28 PROCEDURE — 3078F DIAST BP <80 MM HG: CPT | Performed by: INTERNAL MEDICINE

## 2023-11-28 PROCEDURE — 1159F MED LIST DOCD IN RCRD: CPT | Performed by: INTERNAL MEDICINE

## 2023-11-28 PROCEDURE — 3075F SYST BP GE 130 - 139MM HG: CPT | Performed by: INTERNAL MEDICINE

## 2023-11-28 PROCEDURE — 99214 OFFICE O/P EST MOD 30 MIN: CPT | Performed by: INTERNAL MEDICINE

## 2023-11-28 PROCEDURE — 3008F BODY MASS INDEX DOCD: CPT | Performed by: INTERNAL MEDICINE

## 2023-11-28 PROCEDURE — 1126F AMNT PAIN NOTED NONE PRSNT: CPT | Performed by: INTERNAL MEDICINE

## 2024-01-11 ENCOUNTER — TELEPHONE (OUTPATIENT)
Dept: INTERNAL MEDICINE CLINIC | Facility: CLINIC | Age: 69
End: 2024-01-11

## 2024-01-11 DIAGNOSIS — E78.5 HYPERLIPIDEMIA, UNSPECIFIED HYPERLIPIDEMIA TYPE: Primary | ICD-10-CM

## 2024-01-11 NOTE — TELEPHONE ENCOUNTER
I sent over lab order for CBC CMP and added lipids.  I am not quite sure if patient supposed to have his PSA also for follow-up of his prostate cancer.    We can call wife to let her know the above.  The patient may know when he supposed to have another PSA from Dr. Duran.

## 2024-01-11 NOTE — TELEPHONE ENCOUNTER
To Dr. STRONG-    Spouse requested order for cholesterol testing in her own chart for patient. Pended

## 2024-01-23 NOTE — TELEPHONE ENCOUNTER
Spoke to patient's wife Kobe (OK per HIPAA)  and relayed MD message.  Appt with Dr Fuentes sched on 4/29, will get labs drawn 1 week prior  to Dr Fuentes's appt.  Pt verbalized understanding and agrees with plan.

## 2024-04-22 ENCOUNTER — TELEPHONE (OUTPATIENT)
Dept: INTERNAL MEDICINE CLINIC | Facility: CLINIC | Age: 69
End: 2024-04-22

## 2024-04-22 ENCOUNTER — LAB ENCOUNTER (OUTPATIENT)
Dept: LAB | Facility: HOSPITAL | Age: 69
End: 2024-04-22
Attending: INTERNAL MEDICINE
Payer: MEDICARE

## 2024-04-22 DIAGNOSIS — C61 PROSTATE CANCER (HCC): ICD-10-CM

## 2024-04-22 DIAGNOSIS — E78.5 HYPERLIPIDEMIA, UNSPECIFIED HYPERLIPIDEMIA TYPE: ICD-10-CM

## 2024-04-22 LAB
ALBUMIN SERPL-MCNC: 4.3 G/DL (ref 3.2–4.8)
ALBUMIN/GLOB SERPL: 1.5 {RATIO} (ref 1–2)
ALP LIVER SERPL-CCNC: 63 U/L
ALT SERPL-CCNC: 19 U/L
ANION GAP SERPL CALC-SCNC: 7 MMOL/L (ref 0–18)
AST SERPL-CCNC: 8 U/L (ref ?–34)
BASOPHILS # BLD AUTO: 0.07 X10(3) UL (ref 0–0.2)
BASOPHILS NFR BLD AUTO: 1 %
BILIRUB SERPL-MCNC: 1.1 MG/DL (ref 0.2–1.1)
BUN BLD-MCNC: 20 MG/DL (ref 9–23)
BUN/CREAT SERPL: 19 (ref 10–20)
CALCIUM BLD-MCNC: 9.2 MG/DL (ref 8.7–10.4)
CHLORIDE SERPL-SCNC: 109 MMOL/L (ref 98–112)
CHOLEST SERPL-MCNC: 173 MG/DL (ref ?–200)
CO2 SERPL-SCNC: 25 MMOL/L (ref 21–32)
CREAT BLD-MCNC: 1.05 MG/DL
DEPRECATED RDW RBC AUTO: 45.2 FL (ref 35.1–46.3)
EGFRCR SERPLBLD CKD-EPI 2021: 77 ML/MIN/1.73M2 (ref 60–?)
EOSINOPHIL # BLD AUTO: 0.18 X10(3) UL (ref 0–0.7)
EOSINOPHIL NFR BLD AUTO: 2.5 %
ERYTHROCYTE [DISTWIDTH] IN BLOOD BY AUTOMATED COUNT: 13.2 % (ref 11–15)
FASTING PATIENT LIPID ANSWER: YES
FASTING STATUS PATIENT QL REPORTED: YES
GLOBULIN PLAS-MCNC: 2.9 G/DL (ref 2.8–4.4)
GLUCOSE BLD-MCNC: 91 MG/DL (ref 70–99)
HCT VFR BLD AUTO: 45.2 %
HDLC SERPL-MCNC: 49 MG/DL (ref 40–59)
HGB BLD-MCNC: 15.1 G/DL
IMM GRANULOCYTES # BLD AUTO: 0.03 X10(3) UL (ref 0–1)
IMM GRANULOCYTES NFR BLD: 0.4 %
LDLC SERPL CALC-MCNC: 113 MG/DL (ref ?–100)
LYMPHOCYTES # BLD AUTO: 1.68 X10(3) UL (ref 1–4)
LYMPHOCYTES NFR BLD AUTO: 23.6 %
MCH RBC QN AUTO: 30.8 PG (ref 26–34)
MCHC RBC AUTO-ENTMCNC: 33.4 G/DL (ref 31–37)
MCV RBC AUTO: 92.1 FL
MONOCYTES # BLD AUTO: 0.57 X10(3) UL (ref 0.1–1)
MONOCYTES NFR BLD AUTO: 8 %
NEUTROPHILS # BLD AUTO: 4.6 X10 (3) UL (ref 1.5–7.7)
NEUTROPHILS # BLD AUTO: 4.6 X10(3) UL (ref 1.5–7.7)
NEUTROPHILS NFR BLD AUTO: 64.5 %
NONHDLC SERPL-MCNC: 124 MG/DL (ref ?–130)
OSMOLALITY SERPL CALC.SUM OF ELEC: 294 MOSM/KG (ref 275–295)
PLATELET # BLD AUTO: 241 10(3)UL (ref 150–450)
POTASSIUM SERPL-SCNC: 4.1 MMOL/L (ref 3.5–5.1)
PROT SERPL-MCNC: 7.2 G/DL (ref 5.7–8.2)
PSA SERPL-MCNC: 0.05 NG/ML (ref ?–4)
RBC # BLD AUTO: 4.91 X10(6)UL
SODIUM SERPL-SCNC: 141 MMOL/L (ref 136–145)
TRIGL SERPL-MCNC: 57 MG/DL (ref 30–149)
VLDLC SERPL CALC-MCNC: 10 MG/DL (ref 0–30)
WBC # BLD AUTO: 7.1 X10(3) UL (ref 4–11)

## 2024-04-22 PROCEDURE — 85025 COMPLETE CBC W/AUTO DIFF WBC: CPT

## 2024-04-22 PROCEDURE — 84153 ASSAY OF PSA TOTAL: CPT

## 2024-04-22 PROCEDURE — 80053 COMPREHEN METABOLIC PANEL: CPT

## 2024-04-22 PROCEDURE — 36415 COLL VENOUS BLD VENIPUNCTURE: CPT

## 2024-04-22 PROCEDURE — 80061 LIPID PANEL: CPT

## 2024-04-22 NOTE — TELEPHONE ENCOUNTER
Please let patient know that I thought his test results came out fairly satisfactory.    His cholesterol is still little bit high but we can talk about this when we see each other late May.    His PSA is 0.05.  This is low.  However we should wait to see what Dr. Duran his urologist says about the result.    All in all I think his labs are satisfactory

## 2024-04-29 ENCOUNTER — OFFICE VISIT (OUTPATIENT)
Dept: SURGERY | Facility: CLINIC | Age: 69
End: 2024-04-29

## 2024-04-29 DIAGNOSIS — C61 PROSTATE CANCER (HCC): Primary | ICD-10-CM

## 2024-04-29 DIAGNOSIS — R97.21 RISING PSA FOLLOWING TREATMENT FOR MALIGNANT NEOPLASM OF PROSTATE: ICD-10-CM

## 2024-04-29 DIAGNOSIS — N39.3 MALE STRESS INCONTINENCE: ICD-10-CM

## 2024-04-29 PROCEDURE — 99214 OFFICE O/P EST MOD 30 MIN: CPT | Performed by: UROLOGY

## 2024-04-29 NOTE — PROGRESS NOTES
[facility]  Follow-Up Visit    HPI: Terell Urbano is a 69 year old male presents for a follow up visit. Last seen 10/26/2023. Accompanied by his wife.    INTERVAL HISTORY: Prostate cancer, status post RALP + PLND 9/23/23.    Final pathology revealing bilateral acinar prostatic adenocarcinoma, ángel score 3+4=7 with tertiary ángel pattern 5.  No EPE, SVI or LVI.  + PNI.  Negative margins.  2 lymph nodes negative. pT2 N0 R0    Initial post-prostatectomy PSA level from 10/23/2023 undetectable at <0.01 ng/mL.    Recent PSA level from 4/22/2024 detectable at 0.05 ng/mL.    He consistently doing Kegel exercises at home.  Did not pursue pelvic floor PT.  Using 1-2 pads per day.  Minimal incontinence during the night.    Denies intermittency or straining to urinate.  Voiding with a good stream.  Waking up 2-3 times at night to urinate (similar to prior to surgery).    Bladder scan PVR 0 mL.    No erections since surgery.      1. Clinically Localized Intermediate Risk Prostate Cancer (pT2 pN0 cM0 R0)  No family history of prostate cancer.     Routine prostate cancer screening performed by patient's PCP.  His PSA from 2/9/2023 was noted to be elevated to 4.65 ng/mL.  This is increased from 2.49 ng/mL in 5/2019.     Repeat PSA 4/4/2023 remained elevated at 4.3 ng/mL.     Patient denies any significant LUTS.  He denies gross hematuria or dysuria.  No sensation of incomplete bladder emptying, straining to urinate, or weak stream.     Denies any significant erectile dysfunction.     No bone pain, fevers or chills, chest pain or shortness of breath.    Prostate MRI 6/2023 revealed a PI-RADS 4 lesion in the right PZ mid gland, suspicious for clinically significant prostate cancer.     Patient underwent a UroNav fusion prostate biopsy 7/14/2023 which revealed:  - Prostate volume of TRUS: 29.5 cc.  - PSA density: 0.15 ng/mL/cc  - GG 2 (Ángel 3+4=7) Prostate AdenoCa in 3/3 cores from MANDEEP, 30% involvement, + PNI.  - GG  2 ([place] 3+4=7) PCa in 2/12 sextant cores (RM), 25% involvement, + PNI.  - GG 1 (Ángel 3+3=6) PCa in 3/12 sextant cores (RB, RA), up to 5% involvement, + PNI.    Staging nuclear medicine bone scan and CT of the abdomen and pelvis did not reveal any evidence of metastatic disease.    9/22/23 s/p RALP + PLND    Final pathology revealing bilateral acinar prostatic adenocarcinoma, ángel score 3+4=7 with tertiary ángel pattern 5.  No EPE, SVI or LVI.  + PNI.  Negative margins.  2 lymph nodes negative. pT2 N0 R0    - 10/26/23 F/U: PSA undetectable.  1-2 saturated pads per day.  No erections.  Strong stream.  Start PFPT.    - 4/2024 F/U: PSA 0.05 ng/mL.  Did not pursue pelvic floor PT.  1-2 pads per day.  No erections.  Strong stream.        PAST MEDICAL HISTORY: Hypertension (not on medication).  Prostate cancer 7/2023     PAST SURGICAL HISTORY: Hand surgery. RALP + PLND 9/2023.     SOCIAL HISTORY:  and has 3 grownup children.  No smoking or illicit drug use.  Rare social alcohol.  Works in [employer].     Reviewed past medical, surgical, family, and social history.  Reviewed med list and allergies.      REVIEW OF SYSTEMS:  Pertinent positives and negatives per HPI. A 12-point ROS was performed and is otherwise negative.       EXAM:  There were no vitals taken for this visit.    Physical Exam  Constitutional:       General: He is not in acute distress.     Appearance: He is well-developed.   HENT:      Head: Normocephalic.   Eyes:      General: No scleral icterus.  Cardiovascular:      Rate and Rhythm: Normal rate.   Pulmonary:      Effort: Pulmonary effort is normal. No respiratory distress.   Musculoskeletal:      Cervical back: Normal range of motion.   Skin:     General: Skin is warm and dry.   Neurological:      Mental Status: He is alert and oriented to person, place, and time.   Psychiatric:         Mood and Affect: Mood normal.         Behavior: Behavior normal.         Thought Content: Thought  content normal.         Judgment: Judgment normal.       PATHOLOGY:    Authorizing Provider:  Jose Duran MD       Collected:           09/22/2023 09:20 AM           Ordering Location:     [facility]          Received:            09/22/2023 01:44 PM                                  Operating Room                                                               Pathologist:           Zoya Anderson MD                                                             Specimens:   A) - Yas prostatic fat, 1. yas prostatic fat                                                      B) - Prostate, 1. prostate, bilateral seminel vesicles, bilateral ampulla of vas                    C) - Lymph node pelvic left, 3. left pelvic lymph nodes                                              D) - Lymph node pelvic right, 4. right pelvic lymph nodes                                  Final Diagnosis:      A. Periprostatic fat; excision:   Fibroadipose tissue with vascular congestion.  No evidence of malignancy is seen.     B. Prostate, bilateral seminal vesicles and bilateral ampulla of vas; robotic assisted radical prostatectomy:   Bilateral acinar prostatic adenocarcinoma, [place] score 3+4 = 7, (grade group 2), (15% [place] 4 pattern) with territory [place] pattern 5 (less than 5%).  Dominant tumor nodule measures 2.1 cm in maximum dimension, (right posterior region).    Tumor is involving approximately 8% of the entire prostatic tissue.  No evidence of extra-prostatic tumor extension into the periprostatic fibroadipose tissue is identified.  All inked external specimen margins are free of tumor, with tumor showing a closest approach of 0.5 mm from the inked specimen margin in the right posterior region.  Multifocal intraprostatic tumor perineural invasion present.  No evidence of tumor lymphovascular invasion identified.  Prostatic parenchyma uninvolved by tumor demonstrates glandular and stromal hyperplasia, and multifocal mild  chronic prostatitis.   Bilateral seminal vesicles and vasa deferentia are negative for malignancy.  Pathologic TNM: pT2, N0 [See Prostate Cancer Case Summary].       C. Left pelvic lymph nodes; dissection:   Two benign reactive lymph nodes with fatty changes (0/2).  No evidence of metastatic carcinoma is seen.     D. Right pelvic lymph nodes; dissection:   Fibroadipose tissue with hemorrhage and vascular congestion.  No lymphoid tissue is identified.       Pathology                                Case: [id number]       Provider:  Jose Duran MD       Collected:           07/14/2023                 Location Dave Score % of Pattern 4  Grade Group   Positive Cores / Total Cores  Tumor Length (mm)  % Tissue Involved       A. Region of interest 3+4 10 2 3/3 20 30    B. Left base - -- -- -- -- -    C. Left mid - - - - - -    D. Left apex - - - - - -    E. Right base 3+3 - 1 2/2 4 5    F. Right mid 3+4 10 2 2/2 12 25    G. Right apex 3+3 - 1 1/2 3 3       LABS:      PSA   Latest Ref Rng <=4.00 ng/mL   8/19/2013 1.2    8/20/2014 1.3    3/16/2016 1.5    1/8/2018 2.6    5/23/2019 2.49    2/9/2023 4.65 (H)   4/4/2023 4.30 (H)    10/23/2023 < 0.01   4/22/2024 0.05       IMAGING:    NM BONE SCAN WB (8/2/2023): No evidence for metastatic disease to the bone.     CT ABDOMEN+PELVIS (8/2/2023): 1. No metastatic disease is identified within the abdomen or pelvis. No adenopathy is seen within the abdomen or pelvis. 2. There is a dermal nodule within the cutaneous fat at the right paracentral back posteriorly at the level of L1. Clinical correlation with physical exam is suggested.      UROLOGY PROCEDURE:  None performed today.       IMPRESSION:  69 year old male who is s/p RALP + PLND on 9/22/23    PSA levels initially undetectable however recently became detectable at 0.05 ng/mL.  Results reviewed with patient and wife.  Discussed significance of this rise in PSA.  Recommend monitoring with repeat in 3 months to evaluate PSA  kinetics and doubling time.      Discussed indications for and concept of salvage RT.    Regarding urinary continence: Discussed management options.  Previously referred for pelvic floor PT however did not pursue.  Patient now agreeable to pursue pelvic floor PT.  Recommend continuing Kegel exercises for the time being.    Discussed post-prostatectomy erectile dysfunction.  Management options reviewed.  Patient would like to wait for the time being.      All questions answered.       PLAN:  1.  Continue Kegel exercises.    2.  New referral for pelvic floor physical therapy.    3.  Prostate cancer surveillance with repeat PSA in 3 months.    4.  Patient would like to hold off on further treatment of ED at this point.    RTC for follow-up in 3 months with a PSA prior to office visit.      Jose Duran MD  4/29/2024

## 2024-05-30 ENCOUNTER — OFFICE VISIT (OUTPATIENT)
Dept: INTERNAL MEDICINE CLINIC | Facility: CLINIC | Age: 69
End: 2024-05-30

## 2024-05-30 VITALS
HEART RATE: 94 BPM | WEIGHT: 292 LBS | DIASTOLIC BLOOD PRESSURE: 88 MMHG | OXYGEN SATURATION: 96 % | SYSTOLIC BLOOD PRESSURE: 148 MMHG | TEMPERATURE: 98 F | HEIGHT: 72 IN | BODY MASS INDEX: 39.55 KG/M2

## 2024-05-30 DIAGNOSIS — C61 PROSTATE CANCER (HCC): ICD-10-CM

## 2024-05-30 DIAGNOSIS — Z00.00 MEDICARE ANNUAL WELLNESS VISIT, SUBSEQUENT: Primary | ICD-10-CM

## 2024-05-30 DIAGNOSIS — I10 PRIMARY HYPERTENSION: ICD-10-CM

## 2024-05-30 DIAGNOSIS — E78.5 HYPERLIPIDEMIA, UNSPECIFIED HYPERLIPIDEMIA TYPE: ICD-10-CM

## 2024-05-30 DIAGNOSIS — Z00.00 ROUTINE HEALTH MAINTENANCE: ICD-10-CM

## 2024-05-30 PROCEDURE — G0439 PPPS, SUBSEQ VISIT: HCPCS | Performed by: INTERNAL MEDICINE

## 2024-05-30 PROCEDURE — 96160 PT-FOCUSED HLTH RISK ASSMT: CPT | Performed by: INTERNAL MEDICINE

## 2024-05-30 PROCEDURE — 99214 OFFICE O/P EST MOD 30 MIN: CPT | Performed by: INTERNAL MEDICINE

## 2024-05-30 NOTE — PROGRESS NOTES
Terell Urbano is a 69 year old male.  HPI:     Chief Complaint   Patient presents with    Physical     Medicare annual wellness visit, back pain otherwise no concerns       Terell presents for Medicare annual wellness visit    He feels well.  He follows with Dr. Duran for his history of prostatectomy for prostate carcinoma.  His PSA last was 0.05.  This will be monitored.  He is asymptomatic.  He does have some urinary urgency.  He was advised to get some physical therapy to improve his urgency problem.  He does have hypertension.  I did repeat his blood pressure.  Blood pressure was 148/78 right arm and 140/84 left arm.  We talked about adding another medication but he wishes to hold off and try hard to lose weight over the next 2 months.  He wishes to wait for 2 months and have me check his blood pressure again.  I think this is reasonable.    In addition would benefit according to current guidelines to take a statin medication.  We talked about this.  He is reluctant.  We talked about getting a coronary artery calcium score to help us determine a statin medication.  I think this is reasonable also.  Will follow-up in 2 months.    Talked about the availability of Shingrix and RSV.  We also talked about getting a colonoscopy for screening for colon cancer with Dr. Harrison.    Current Outpatient Medications   Medication Sig Dispense Refill    amLODIPine 5 MG Oral Tab Take 1 tablet (5 mg total) by mouth daily. 30 tablet 11      Past Medical History:    Cancer (HCC)    prostate    Cellulitis    as per NG    Hemorrhoids    as per NG    High blood pressure    Lipid screening    as per NG    Migraine    as per NG    Visual impairment    glasses      Social History:  Social History     Socioeconomic History    Marital status:    Tobacco Use    Smoking status: Never    Smokeless tobacco: Never   Vaping Use    Vaping status: Never Used   Substance and Sexual Activity    Alcohol use: No    Drug use: No   Other  Topics Concern    Caffeine Concern Yes     Comment: Coffee, 3 cups; as per         REVIEW OF SYSTEMS:   GENERAL HEALTH:  feels well otherwise  RESPIRATORY:  Voices no shortness of breath with exertion or cough  CARDIOVASCULAR:  Voices no chest pain on exertion or shortness of breath  GI:   Voices no abdominal pain or changes of bowels   :Viices no urning or frequency of urination.  NEURO:  Voices no  headaches or dizziness    EXAM:   /88   Pulse 94   Temp 97.8 °F (36.6 °C) (Oral)   Ht 6' (1.829 m)   Wt 292 lb (132.5 kg)   SpO2 96%   BMI 39.60 kg/m²     GENERAL:  well developed, well nourished, in no apparent distress  SKIN:  no rashes ,   HEENT: atraumatic.   Pharynx normal without exudate.  EYES:  PERRL. Sclera anicteric.  NECK:  Supple,  no adenopathy,    LUNGS:  clear to auscultation.  Effort normal  CARDIO:  RRR without murmur.   S1 and S2 normal  GI:  good BS's,  no masses,   HSM or tenderness  EXTREMITIES : no cyanosis, clubbing or edema      General Health     In the past six months, have you lost more than 10 pounds without trying?: 2 - No    Has your appetite been poor?: No    Type of Diet: Balanced    How does the patient maintain a good energy level?: Other    How would you describe your daily physical activity?: Heavy    How would you describe your current health state?: Good    How do you maintain positive mental well-being?: Social Interaction         Have you had any immunizations at another office such as Influenza, Hepatitis B, Tetanus, or Pneumococcal?: No     Functional Ability     Bathing or Showering: Able without help    Toileting: Able without help    Dressing: Able without help    Eating: Able without help    Driving: Able without help    Preparing your meals: Able without help    Managing money/bills: Able without help    Taking medications as prescribed: Able without help    Are you able to afford your medications?: Yes    Hearing Problems?: No     Functional Status      Hearing Problems?: No    Vision Problems? : No    Difficulty walking?: No    Difficulty dressing or bathing?: No    Problems with daily activities? : No    Memory Problems?: No      Fall/Risk Assessment                                                              Depression Screening (PHQ-2/PHQ-9): Over the LAST 2 WEEKS                      Advance Directives     Do you have a healthcare power of ?: Yes    Do you have a living will?: No     Hearing Assessment (Required for AWV/SWV)      Hearing Screening    Time taken: 5/30/2024  2:57 PM  Entry User: Nathalie Kwon RN  Screening Method: Questionnaire  I have a problem hearing over the telephone: No I have trouble following the conversations when two or more people are talking at the same time: No   I have trouble understanding things on the TV: No I have to strain to understand conversations: No   I have to worry about missing the telephone ring or doorbell: No I have trouble hearing conversations in a noisy background such as a crowded room or restaurant: No   I get confused about where sounds come from: No I misunderstand some words in a sentence and need to ask people to repeat themselves: No   I especially have trouble understanding the speech of women and children: No I have trouble understanding the speaker in a large room such as at a meeting or place of [employer]: No   Many people I talk to seem to mumble (or don't speak clearly): No People get annoyed because I misunderstand what they say: No   I misunderstand what others are saying and make inappropriate responses: No I avoid social activities because I cannot hear well and fear I will reply improperly: No   Family members and friends have told me they think I may have hearing loss: No             Visual Acuity     Right Eye Visual Acuity: Corrected Left Eye Visual Acuity: Corrected   Right Eye Chart Acuity: 20/40 Left Eye Chart Acuity: 20/100     Cognitive Assessment     What day of the week is  this?: Correct    What month is it?: Correct    What year is it?: Correct    Recall \"Ball\": Correct    Recall \"Flag\": Correct    Recall \"Tree\": Correct         Terell Urbano's SCREENING SCHEDULE   Tests on this list are recommended by your physician but may not be covered, or covered at this frequency, by your insurer. Please check with your insurance carrier before scheduling to verify coverage.    PREVENTATIVE SERVICES  INDICATIONS AND SCHEDULE Internal Lab or Procedure External Lab or Procedure   Diabetes Screening      HbgA1C   Annually GLYCOHEMOGLOBIN (HgA1c) (L) (%)   Date Value   09/19/2016 5.7     Glycohemoglobin (HgA1c) (%)   Date Value   07/10/2018 5.7         No data to display                Fasting Blood Sugar (FSB) Annually Glucose (mg/dL)   Date Value   04/22/2024 91       Cardiovascular Disease Screening     LDL Annually LDL Cholesterol (mg/dL)   Date Value   04/22/2024 113 (H)        EKG One Time     Colorectal Cancer Screening      Colonoscopy Screen every 10 years Health Maintenance   Topic Date Due    Colorectal Cancer Screening  Never done    Update Health Maintenance if applicable    Flex Sigmoidoscopy Screen every 5 years No results found for this or any previous visit.      No data to display                 Fecal Occult Blood Annually No results found for: \"FOB\", \"OCCULTSTOOL\"      No data to display                Glaucoma Screening      Ophthalmology Visit Annually     Prostate Cancer Screening      PSA  Annually Health Maintenance   Topic Date Due    PSA  04/22/2026     Update Health Maintenance if applicable   Immunizations      Influenza No orders found for this or any previous visit. Update Immunization Activity if applicable    Pneumococcal No orders found for this or any previous visit. Update Immunization Activity if applicable    Hepatitis B No orders found for this or any previous visit. Update Immunization Activity if applicable    Tetanus No orders found for this or any  previous visit. Update Immunization Activity if applicable    Zoster (Not covered by Medicare Part B) No orders found for this or any previous visit. Update Immunization Activity if applicable     SPECIFIC DISEASE MONITORING Internal Lab or Procedure External Lab or Procedure   Annual Monitoring of Persistent     Medications (ACE/ARB, digoxin, diuretics)    Potassium  Annually Potassium (mmol/L)   Date Value   04/22/2024 4.1     POTASSIUM (P) (mmol/L)   Date Value   09/19/2016 4.2         No data to display                Creatinine  Annually Creatinine (mg/dL)   Date Value   04/22/2024 1.05         No data to display                Digoxin Serum Conc  Annually No results found for: \"DIGOXIN\"      No data to display                Diabetes      HgbA1C  Annually GLYCOHEMOGLOBIN (HgA1c) (L) (%)   Date Value   09/19/2016 5.7     Glycohemoglobin (HgA1c) (%)   Date Value   07/10/2018 5.7         No data to display                Creat/alb ratio  Annually      LDL  Annually LDL Cholesterol (mg/dL)   Date Value   04/22/2024 113 (H)         No data to display                 Dilated Eye exam  Annually      No data to display                   No data to display                COPD      Spirometry Testing Annually No results found for this or any previous visit.      No data to display                     ASSESSMENT AND PLAN:     1. Medicare annual wellness visit, subsequent  Medicare annual wellness visit    2. Primary hypertension  Hypertension.  I believe he would benefit from adding a second antihypertensive medication but as above he wishes to hold off.  Attempt weight loss.  Follow-up with me in 2 months.    3. Hyperlipidemia, unspecified hyperlipidemia type  We talked about adding statin medication to reduce his future risk of cardiovascular disease and cerebrovascular disease.  He wishes to first get a coronary artery calcium score and then use that to help determine pros and cons of taking a statin medicine    4.  Prostate cancer (HCC)  He follows closely with Dr. Duran.    5. Routine health maintenance  We talked about availability of Shingrix and RSV vaccine.    This visit was 30 minutes.  I spent 10 minutes before visit preparing and reviewing old records.  Greater than 50% of the visit was engaged in counseling and review of past data.    Problem list as noted in electronic health record reviewed.  Stable.  Will be followed.    Abnormal glucose  Elevated blood pressure reading  Overweight  Elevated PSA  Acquired deformity of finger of right hand  Prostate cancer (HCC)  Essential hypertension    Follow-up in 2 months      The patient indicates understanding of these issues and agrees to the plan.    Kleber Turcios MD  5/30/2024  3:25 PM

## 2024-06-27 ENCOUNTER — TELEPHONE (OUTPATIENT)
Dept: SURGERY | Facility: CLINIC | Age: 69
End: 2024-06-27

## 2024-07-25 ENCOUNTER — HOSPITAL ENCOUNTER (OUTPATIENT)
Dept: CT IMAGING | Facility: HOSPITAL | Age: 69
Discharge: HOME | End: 2024-07-25
Attending: INTERNAL MEDICINE

## 2024-07-25 ENCOUNTER — LAB ENCOUNTER (OUTPATIENT)
Dept: LAB | Age: 69
End: 2024-07-25
Attending: INTERNAL MEDICINE

## 2024-07-25 VITALS — BODY MASS INDEX: 37.93 KG/M2 | WEIGHT: 280 LBS | HEIGHT: 72 IN

## 2024-07-25 DIAGNOSIS — C61 PROSTATE CANCER (HCC): ICD-10-CM

## 2024-07-25 DIAGNOSIS — Z13.6 SCREENING FOR HEART DISEASE: ICD-10-CM

## 2024-07-25 LAB — PSA SERPL-MCNC: <0.04 NG/ML (ref ?–4)

## 2024-07-25 PROCEDURE — 84153 ASSAY OF PSA TOTAL: CPT

## 2024-07-25 PROCEDURE — 36415 COLL VENOUS BLD VENIPUNCTURE: CPT

## 2024-07-25 NOTE — ADDENDUM NOTE
Encounter addended by: Belia Gibbs on: 7/25/2024 6:04 PM   Actions taken: Imaging Exam ended Yes - the patient is able to be screened

## 2024-07-25 NOTE — PROGRESS NOTES
Date of Service 7/25/2024    JULIO MAY  Date of Birth 1/28/1955    Patient Age: 69 year old    PCP: Kleber Turcios MD  [address] [place] [phone]    [facility]     Heart Scan Consult  Preliminary Heart Scan Score: 569  Denies SOB or Chest pain   Previous Screening  Heart Scan Completed Previously: No        Peripheral Vascular Scan Completed Previously: No          Risk Factors  Personal Risk Factors  Non-alterable Risk Factors: Age  Alterable Risk Factors: High Blood Pressure;Obesity;Abnormal Cholesterol      Body Mass Index  Body mass index is 37.97 kg/m².    Blood Pressure measuring b/p at home takes amlodipine     Blood Pressure measurement declined during this encounter.    (Normal =< 120/80,  Elevated = 120-129/ >80,  High Stage1 130-139/80-89 , Stage2 >140/>90)    Lipid Profile  Cholesterol: 173, done on 4/22/2024.  HDL Cholesterol: 49, done on 4/22/2024.  LDL Cholesterol: 113, done on 4/22/2024.  TriGlycerides 57, done on 4/22/2024.    Cholesterol Goals  Value   Total  =< 200   HDL  = > 45 Men = > 55 Women   LDL   =< 100   Triglycerides  =< 150       Glucose and Hemoglobin A1C  Lab Results   Component Value Date    GLU 91 04/22/2024    A1C 5.7 07/10/2018     (Normal Fasting Glucose < 100mg/dl )    Nurse Review  Risk factor information and results reviewed with Nurse: Yes    Recommended Follow Up:  Consult your physician regarding:: Final Heart Scan Report;Discuss potential for Incidental Finding;Discuss Potential for Score Variance      Recommendations for Change:  Nutrition Changes: Low Saturated Fat;Low Fat Dairy;Low Salt Eating;Increase Fiber    Cholesterol Modification (goal of therapy depends upon your risk): Decrease LDL (Lousy/Bad) Ideal <100              Weight Management: Decrease Current Weight    Stress Management: Adopt Stress Management Techniques    Repeat Heart Scan: Discuss with your Physician              [place] Recommended  Resources:  Recommended Resources: Upcoming Classes, Medical Services and Health Library [email or web address];PV Screening  Recommended PV Screening: Abdomen;Carotids         Nicky MONTALVO, RN        Please Contact the Nurse Heart Line with any Questions or Concerns [phone].

## 2024-07-29 ENCOUNTER — TELEPHONE (OUTPATIENT)
Dept: INTERNAL MEDICINE CLINIC | Facility: CLINIC | Age: 69
End: 2024-07-29

## 2024-07-29 ENCOUNTER — OFFICE VISIT (OUTPATIENT)
Dept: SURGERY | Facility: CLINIC | Age: 69
End: 2024-07-29

## 2024-07-29 DIAGNOSIS — N39.3 MALE STRESS INCONTINENCE: ICD-10-CM

## 2024-07-29 DIAGNOSIS — N52.31 ERECTILE DYSFUNCTION AFTER RADICAL PROSTATECTOMY: ICD-10-CM

## 2024-07-29 DIAGNOSIS — C61 PROSTATE CANCER (HCC): Primary | ICD-10-CM

## 2024-07-29 PROCEDURE — 99213 OFFICE O/P EST LOW 20 MIN: CPT | Performed by: UROLOGY

## 2024-07-29 NOTE — PROGRESS NOTES
[facility] Urology  Follow-Up Visit    HPI: Terell Urbano is a 69 year old male presents for a follow up visit. Last seen 4/29/2024. Accompanied by his wife.    INTERVAL HISTORY: Prostate cancer, status post RALP + PLND 9/23/23.    Final pathology revealing bilateral acinar prostatic adenocarcinoma, ángel score 3+4=7 with tertiary ángel pattern 5.  No EPE, SVI or LVI.  + PNI.  Negative margins.  2 lymph nodes negative. pT2 N0 R0    Initial post-prostatectomy PSA level from 10/23/2023 undetectable at <0.01 ng/mL.    PSA level from 4/22/2024 detectable at 0.05 ng/mL.    Recent PSA from 7/25/2024 back to being undetectable at <0.04 ng/mL.    He is doing Kegel exercises at home.  Completing pelvic floor PT.  Using 1 safety pad per day.  No incontinence at night.    Voiding with a good stream.    No erections since surgery.  Elected watchful waiting.      1. Clinically Localized Intermediate Risk Prostate Cancer (pT2 pN0 cM0 R0)  No family history of prostate cancer.     Routine prostate cancer screening performed by patient's PCP.  His PSA from 2/9/2023 was noted to be elevated to 4.65 ng/mL.  This is increased from 2.49 ng/mL in 5/2019.     Repeat PSA 4/4/2023 remained elevated at 4.3 ng/mL.     Patient denies any significant LUTS.  He denies gross hematuria or dysuria.  No sensation of incomplete bladder emptying, straining to urinate, or weak stream.     Denies any significant erectile dysfunction.     No bone pain, fevers or chills, chest pain or shortness of breath.    Prostate MRI 6/2023 revealed a PI-RADS 4 lesion in the right PZ mid gland, suspicious for clinically significant prostate cancer.     Patient underwent a UroNav fusion prostate biopsy 7/14/2023 which revealed:  - Prostate volume of TRUS: 29.5 cc.  - PSA density: 0.15 ng/mL/cc  - GG 2 ([place] 3+4=7) Prostate AdenoCa in 3/3 cores from MANDEEP, 30% involvement, + PNI.  - GG 2 ([place] 3+4=7) PCa in 2/12 sextant cores (RM), 25% involvement, + PNI.  -  GG 1 ([place] 3+3=6) PCa in 3/12 sextant cores (RB, RA), up to 5% involvement, + PNI.    Staging nuclear medicine bone scan and CT of the abdomen and pelvis did not reveal any evidence of metastatic disease.    9/22/23 s/p RALP + PLND    Final pathology revealing bilateral acinar prostatic adenocarcinoma, ángel score 3+4=7 with tertiary ángel pattern 5.  No EPE, SVI or LVI.  + PNI.  Negative margins.  2 lymph nodes negative. pT2 N0 R0    - 10/26/23 F/U: PSA undetectable.  1-2 saturated pads per day.  No erections.  Strong stream.  Start PFPT.    - 4/2024 F/U: PSA 0.05 ng/mL.  Did not pursue pelvic floor PT.  1-2 pads per day.  No erections.  Strong stream.    - 7/2024 follow-up.  PSA undetectable.  Completing pelvic floor PT.  1 safety pad per day.  Voiding with a strong stream.  No erections.  Would like to monitor ED.        PAST MEDICAL HISTORY: Hypertension (not on medication).  Prostate cancer 7/2023     PAST SURGICAL HISTORY: Hand surgery. RALP + PLND 9/2023.     SOCIAL HISTORY:  and has 3 grownup children.  No smoking or illicit drug use.  Rare social alcohol.  Works in [employer].     Reviewed past medical, surgical, family, and social history.  Reviewed med list and allergies.      REVIEW OF SYSTEMS:  Pertinent positives and negatives per HPI. A 12-point ROS was performed and is otherwise negative.       EXAM:  There were no vitals taken for this visit.    Physical Exam  Constitutional:       General: He is not in acute distress.     Appearance: He is well-developed.   HENT:      Head: Normocephalic.   Eyes:      General: No scleral icterus.  Cardiovascular:      Rate and Rhythm: Normal rate.   Pulmonary:      Effort: Pulmonary effort is normal. No respiratory distress.   Musculoskeletal:      Cervical back: Normal range of motion.   Skin:     General: Skin is warm and dry.   Neurological:      Mental Status: He is alert and oriented to person, place, and time.   Psychiatric:         Mood and  Affect: Mood normal.         Behavior: Behavior normal.         Thought Content: Thought content normal.         Judgment: Judgment normal.       PATHOLOGY:    Authorizing Provider:  Jose Duran MD       Collected:           09/22/2023 09:20 AM           Ordering Location:     [facility]          Received:            09/22/2023 01:44 PM                                  Operating Room                                                               Pathologist:           Zoya Anderson MD                                                             Specimens:   A) - Yas prostatic fat, 1. yas prostatic fat                                                      B) - Prostate, 1. prostate, bilateral seminel vesicles, bilateral ampulla of vas                    C) - Lymph node pelvic left, 3. left pelvic lymph nodes                                              D) - Lymph node pelvic right, 4. right pelvic lymph nodes                                  Final Diagnosis:      A. Periprostatic fat; excision:   Fibroadipose tissue with vascular congestion.  No evidence of malignancy is seen.     B. Prostate, bilateral seminal vesicles and bilateral ampulla of vas; robotic assisted radical prostatectomy:   Bilateral acinar prostatic adenocarcinoma, [place] score 3+4 = 7, (grade group 2), (15% [place] 4 pattern) with territory Dave pattern 5 (less than 5%).  Dominant tumor nodule measures 2.1 cm in maximum dimension, (right posterior region).    Tumor is involving approximately 8% of the entire prostatic tissue.  No evidence of extra-prostatic tumor extension into the periprostatic fibroadipose tissue is identified.  All inked external specimen margins are free of tumor, with tumor showing a closest approach of 0.5 mm from the inked specimen margin in the right posterior region.  Multifocal intraprostatic tumor perineural invasion present.  No evidence of tumor lymphovascular invasion identified.  Prostatic parenchyma  uninvolved by tumor demonstrates glandular and stromal hyperplasia, and multifocal mild chronic prostatitis.   Bilateral seminal vesicles and vasa deferentia are negative for malignancy.  Pathologic TNM: pT2, N0 [See Prostate Cancer Case Summary].       C. Left pelvic lymph nodes; dissection:   Two benign reactive lymph nodes with fatty changes (0/2).  No evidence of metastatic carcinoma is seen.     D. Right pelvic lymph nodes; dissection:   Fibroadipose tissue with hemorrhage and vascular congestion.  No lymphoid tissue is identified.       Pathology                                Case: [id number]       Provider:  Jose Duran MD       Collected:           07/14/2023                 Location [place] Score % of Pattern 4  Grade Group   Positive Cores / Total Cores  Tumor Length (mm)  % Tissue Involved       A. Region of interest 3+4 10 2 3/3 20 30    B. Left base - -- -- -- -- -    C. Left mid - - - - - -    D. Left apex - - - - - -    E. Right base 3+3 - 1 2/2 4 5    F. Right mid 3+4 10 2 2/2 12 25    G. Right apex 3+3 - 1 1/2 3 3       LABS:      PSA   Latest Ref Rng <=4.00 ng/mL   8/19/2013 1.2    8/20/2014 1.3    3/16/2016 1.5    1/8/2018 2.6    5/23/2019 2.49    2/9/2023 4.65 (H)   4/4/2023 4.30 (H)    10/23/2023 < 0.01   4/22/2024 0.05   7/25/2024 <0.04       IMAGING:    NM BONE SCAN WB (8/2/2023): No evidence for metastatic disease to the bone.     CT ABDOMEN+PELVIS (8/2/2023): 1. No metastatic disease is identified within the abdomen or pelvis. No adenopathy is seen within the abdomen or pelvis. 2. There is a dermal nodule within the cutaneous fat at the right paracentral back posteriorly at the level of L1. Clinical correlation with physical exam is suggested.      UROLOGY PROCEDURE:  None performed today.       IMPRESSION:  69 year old male who is s/p RALP + PLND on 9/22/23    PSA levels initially undetectable however recently became detectable at 0.05 ng/mL.  Recent PSA has decreased and is now  undetectable again.    Reviewed with patient.  Results are reassuring.  No indication for any further treatment at this point.  Recommend continued surveillance with repeat PSA in 6 months.    Regarding urinary continence: Recommend continuing Kegel exercises and completing pelvic floor PT.  He has made good recovery thus far.    Discussed post-prostatectomy erectile dysfunction.  Management options reviewed.  Patient would like to wait for the time being.      All questions answered.       PLAN:  1.  Continue Kegel exercises.    2.  Prostate cancer surveillance with repeat PSA in 6 months.    3.  Patient would like to hold off on further treatment of ED at this point.    RTC for follow-up in 6 months with a PSA prior to office visit.      Jose Duran MD  7/29/2024

## 2024-07-29 NOTE — TELEPHONE ENCOUNTER
Discussed with [facility] today.  They will call Terell to make an appointment to see him in a couple weeks for review of his coronary calcium score.

## 2024-07-29 NOTE — TELEPHONE ENCOUNTER
I discussed with Terell yesterday's coronary artery calcium score.  He has 454 in LAD.  70 in the left main.  I did indicate to him that it would be wise for him to follow-up with  who he had seen in the past.    We discussed that he will most likely need some additional cardiac testing such as a stress test or CTA of his coronary arteries.    He verbalized understanding.  I told him that I will call [facility] today to let them know the result and figure out if they will call Terell for an appointment or if they wish Terell to call for an appointment.    Terell is an appointment this Thursday.  We talked about starting statin therapy.    He will be going on vacation over the weekend and wishes to delay starting any statin therapy until after his vacation.    He is asymptomatic without chest pain.    He verbalized understanding to the above.

## 2024-08-01 ENCOUNTER — OFFICE VISIT (OUTPATIENT)
Dept: INTERNAL MEDICINE CLINIC | Facility: CLINIC | Age: 69
End: 2024-08-01

## 2024-08-01 VITALS
HEIGHT: 72 IN | TEMPERATURE: 98 F | SYSTOLIC BLOOD PRESSURE: 134 MMHG | BODY MASS INDEX: 38.31 KG/M2 | DIASTOLIC BLOOD PRESSURE: 70 MMHG | HEART RATE: 85 BPM | OXYGEN SATURATION: 96 % | WEIGHT: 282.81 LBS

## 2024-08-01 DIAGNOSIS — C61 PROSTATE CANCER (HCC): ICD-10-CM

## 2024-08-01 DIAGNOSIS — E78.5 HYPERLIPIDEMIA, UNSPECIFIED HYPERLIPIDEMIA TYPE: ICD-10-CM

## 2024-08-01 DIAGNOSIS — R93.1 ELEVATED CORONARY ARTERY CALCIUM SCORE: ICD-10-CM

## 2024-08-01 DIAGNOSIS — I10 PRIMARY HYPERTENSION: Primary | ICD-10-CM

## 2024-08-01 DIAGNOSIS — Z00.00 ROUTINE HEALTH MAINTENANCE: ICD-10-CM

## 2024-08-01 PROBLEM — E66.01 SEVERE OBESITY (BMI 35.0-39.9) WITH COMORBIDITY (HCC): Chronic | Status: ACTIVE | Noted: 2024-08-01

## 2024-08-01 RX ORDER — ROSUVASTATIN CALCIUM 10 MG/1
10 TABLET, COATED ORAL NIGHTLY
Qty: 30 TABLET | Refills: 11 | Status: SHIPPED | OUTPATIENT
Start: 2024-08-01

## 2024-08-01 NOTE — PROGRESS NOTES
Terell Urbano is a 69 year old male.  HPI:     Chief Complaint   Patient presents with    Checkup     2 months      Terell is here with his wife Kobe.    He had a recent coronary artery calcium score that showed that he had left main 70.5.  .  Circumflex 44.5.  He is active at work.  He pulls and lifts things.  No chest pain or shortness of breath.    We talked about this.  He does have a follow-up with  August 20.  He will try and get an earlier appointment.    He will be going on vacation to [place] this weekend for a week.    I asked him to take a baby aspirin 81 mg a day.  I will also start Crestor 10 mg a day.    I asked him to follow-up with me in 1 month.  He does have hypertension his blood pressure is under good control.    He has history of prostate carcinoma status post prostatectomy.  He has kept up with his follow-up with Dr. Duran who saw him July 29.  It was noted that his PSA levels initially undetectable however recently became detectable at 0.05.  PSA July 25 is now less than 0.04.  Per Dr. Duran will get follow-up PSA in 6 months.    I did tell him that if in [place] he should get any cardiac symptoms such as chest pain or shortness of breath he should immediately go to local emergency room.  He verbalized understanding.  Current Outpatient Medications   Medication Sig Dispense Refill    rosuvastatin (CRESTOR) 10 MG Oral Tab Take 1 tablet (10 mg total) by mouth nightly. 30 tablet 11    amLODIPine 5 MG Oral Tab Take 1 tablet (5 mg total) by mouth daily. 30 tablet 11      Past Medical History:    Cancer (HCC)    prostate    Cellulitis    as per NG    Hemorrhoids    as per NG    High blood pressure    Lipid screening    as per NG    Migraine    as per NG    Visual impairment    glasses      Social History:  Social History     Socioeconomic History    Marital status:    Tobacco Use    Smoking status: Never    Smokeless tobacco: Never   Vaping Use    Vaping  status: Never Used   Substance and Sexual Activity    Alcohol use: No    Drug use: No   Other Topics Concern    Caffeine Concern Yes     Comment: Coffee, 3 cups; as per NG        REVIEW OF SYSTEMS:   GENERAL HEALTH:  feels well otherwise  RESPIRATORY:  Voices no shortness of breath with exertion or cough  CARDIOVASCULAR:  Voices no chest pain on exertion or shortness of breath  GI:   Voices no abdominal pain or changes of bowels   :Viices no urning or frequency of urination.  NEURO:  Voices no  headaches or dizziness    EXAM:   /70   Pulse 85   Temp 97.6 °F (36.4 °C)   Ht 6' (1.829 m)   Wt 282 lb 12.8 oz (128.3 kg)   SpO2 96%   BMI 38.35 kg/m²     GENERAL:  well developed, well nourished, in no apparent distress  SKIN:  no rashes ,   HEENT: atraumatic.  Pharynx normal without exudate.  EYES:  PERRL. Sclera anicteric.  NECK:  Supple,  no adenopathy,   LUNGS:  clear to auscultation.  Effort normal  CARDIO:  RRR without murmur.   S1 and S2 normal  GI:  good BS's,  no masses,   HSM or tenderness  EXTREMITIES : no cyanosis, clubbing or edema    ASSESSMENT AND PLAN:     1. Primary hypertension  Blood pressure under satisfactory control on Norvasc    2. Elevated coronary artery calcium score  Elevated coronary artery calcium score as above.  He will follow-up with cardiology August 20.  Will start statin and aspirin 81 mg a day.  He knows to take it easy around the house.  Not push himself.  If he gets any symptoms go directly to the emergency room.    3. Prostate cancer (HCC)  Prostate cancer.  Recent PSA undetectable.  He follows closely with Dr. Duran.  Next visit with Dr. Duran will be January 29, 2025    4. Hyperlipidemia, unspecified hyperlipidemia type  Hyperlipidemia.  Start Crestor.  Last lipids April 22 of this year showed cholesterol 173.  HDL 49.  Triglycerides 57 and     5. Routine health maintenance  Will discuss vaccines next visit.    This visit was 30 minutes.  I spent 10 minutes  before visit preparing and reviewing old records.  Greater than 50% of the visit was engaged in counseling and review of past data.    Follow-up in 1 month    The patient indicates understanding of these issues and agrees to the plan.    Kleber Turcios MD  8/1/2024  3:30 PM

## 2024-08-06 ENCOUNTER — TELEPHONE (OUTPATIENT)
Dept: SURGERY | Facility: CLINIC | Age: 69
End: 2024-08-06

## 2024-08-08 NOTE — TELEPHONE ENCOUNTER
Order signed by MD, faxed to [facility]. Will send copy to scanning.    Nic Rosales  (RN)  2019 23:26:51 Sushma Mackey  (GROVER)  2019 12:28:30

## 2024-08-22 RX ORDER — AMLODIPINE BESYLATE 5 MG/1
5 TABLET ORAL DAILY
Qty: 30 TABLET | Refills: 11 | Status: SHIPPED | OUTPATIENT
Start: 2024-08-22 | End: 2025-08-17

## 2024-08-22 NOTE — TELEPHONE ENCOUNTER
Refill request is for a maintenance medication and has met the criteria specified in the Ambulatory Medication Refill Standing Order for eligibility, visits, laboratory, alerts and was sent to the requested pharmacy.    Requested Prescriptions     Signed Prescriptions Disp Refills    AMLODIPINE 5 MG Oral Tab 30 tablet 11     Sig: TAKE 1 TABLET (5 MG TOTAL) BY MOUTH DAILY.     Authorizing Provider: MARQUIS NEGRON     Ordering User: NABOR MCKENZIE

## 2024-09-03 ENCOUNTER — TELEPHONE (OUTPATIENT)
Dept: SURGERY | Facility: CLINIC | Age: 69
End: 2024-09-03

## 2024-09-03 NOTE — TELEPHONE ENCOUNTER
Fax received from [facility] for a signature by Dr. STEVEN plan of care-discharge note. Placed on Dr. STEVEN desk for a signature.

## 2024-09-04 ENCOUNTER — TELEPHONE (OUTPATIENT)
Dept: SURGERY | Facility: CLINIC | Age: 69
End: 2024-09-04

## 2024-09-05 ENCOUNTER — TELEPHONE (OUTPATIENT)
Dept: INTERNAL MEDICINE CLINIC | Facility: CLINIC | Age: 69
End: 2024-09-05

## 2024-09-05 ENCOUNTER — OFFICE VISIT (OUTPATIENT)
Dept: INTERNAL MEDICINE CLINIC | Facility: CLINIC | Age: 69
End: 2024-09-05

## 2024-09-05 VITALS
WEIGHT: 282 LBS | HEART RATE: 79 BPM | BODY MASS INDEX: 38.19 KG/M2 | TEMPERATURE: 98 F | OXYGEN SATURATION: 98 % | HEIGHT: 72 IN | SYSTOLIC BLOOD PRESSURE: 130 MMHG | DIASTOLIC BLOOD PRESSURE: 82 MMHG

## 2024-09-05 DIAGNOSIS — Z12.11 COLON CANCER SCREENING: ICD-10-CM

## 2024-09-05 DIAGNOSIS — I10 PRIMARY HYPERTENSION: ICD-10-CM

## 2024-09-05 DIAGNOSIS — R93.1 ELEVATED CORONARY ARTERY CALCIUM SCORE: Primary | ICD-10-CM

## 2024-09-05 DIAGNOSIS — E78.5 HYPERLIPIDEMIA, UNSPECIFIED HYPERLIPIDEMIA TYPE: ICD-10-CM

## 2024-09-05 DIAGNOSIS — C61 PROSTATE CANCER (HCC): ICD-10-CM

## 2024-09-05 PROCEDURE — 99214 OFFICE O/P EST MOD 30 MIN: CPT | Performed by: INTERNAL MEDICINE

## 2024-09-05 NOTE — PROGRESS NOTES
Terell Urbano is a 69 year old male.  HPI:     Chief Complaint   Patient presents with    Follow - Up     1 month f/u       Terell presents with his wife Kobe.    He did meet with Dr. Clement last week.  He will be having a stress test this coming Tuesday.  He has a history of coronary calcium of 569 with 454 in LAD.  He is asymptomatic    I neglected to discuss with him again as in the past colon cancer screening with colonoscopy and I will send him a [facility] message.    He will be getting lipids ordered by Dr. Clement.  He is on statin.    His blood pressure is under satisfactory control with amlodipine.    He has had a prostatectomy.  He follows with Dr. Duran.  His last PSA April 22, 2024 was 0.05.    His next appointment with Dr. Duran will be January 29, 2025 his next PSA will be January 2025..     We did talk about vaccines.  We talked about PCV 20 vaccine and he would like to hold off.  He knows about the availability Shingrix.  He knows about getting flu and COVID-vaccine.  He knows about the availability of RSV.    Recently had an episode of 3 days of nausea vomiting and diarrhea and lower abdominal cramps.  His wife had the same.  He is recovered.  Current Outpatient Medications   Medication Sig Dispense Refill    AMLODIPINE 5 MG Oral Tab TAKE 1 TABLET (5 MG TOTAL) BY MOUTH DAILY. 30 tablet 11    rosuvastatin (CRESTOR) 10 MG Oral Tab Take 1 tablet (10 mg total) by mouth nightly. 30 tablet 11      Past Medical History:    Cancer (HCC)    prostate    Cellulitis    as per NG    Hemorrhoids    as per NG    High blood pressure    Lipid screening    as per NG    Migraine    as per NG    Visual impairment    glasses      Social History:  Social History     Socioeconomic History    Marital status:    Tobacco Use    Smoking status: Never    Smokeless tobacco: Never   Vaping Use    Vaping status: Never Used   Substance and Sexual Activity    Alcohol use: No    Drug use: No   Other Topics  Concern    Caffeine Concern Yes     Comment: Coffee, 3 cups; as per NG        REVIEW OF SYSTEMS:   GENERAL HEALTH:  feels well otherwise  RESPIRATORY:  Voices no shortness of breath with exertion or cough  CARDIOVASCULAR:  Voices no chest pain on exertion or shortness of breath  GI:   Voices no abdominal pain or changes of bowels   :Viices no urning or frequency of urination.  NEURO:  Voices no  headaches or dizziness    EXAM:   /82   Pulse 79   Temp 97.6 °F (36.4 °C)   Ht 6' (1.829 m)   Wt 282 lb (127.9 kg)   SpO2 98%   BMI 38.25 kg/m²     GENERAL:  well developed, well nourished, in no apparent distress  SKIN:  no rashes ,   HEENT: atraumatic.    Pharynx normal without exudate.  EYES:  PERRL. Sclera anicteric.  NECK:  Supple,  no adenopathy,    LUNGS:  clear to auscultation.  Effort normal  CARDIO:  RRR without murmur.   S1 and S2 normal  GI:  good BS's,  no masses,   HSM or tenderness  EXTREMITIES : no cyanosis, clubbing or edema    ASSESSMENT AND PLAN:     1. Elevated coronary artery calcium score  Elevated coronary artery calcium score.  Asymptomatic.  He follows with cardiology.  He will have a stress test next week.    2. Prostate cancer (HCC)  He follows closely with Dr. Duran    3. Primary hypertension  Blood pressure under satisfactory control    4. Hyperlipidemia, unspecified hyperlipidemia type  Will be getting updated lipids.  On Crestor.    5. Colon cancer screening  Will send a MyChart message regarding colon cancer screening.    This visit was 30 minutes.  I spent 10 minutes before visit preparing and reviewing old records.  Greater than 50% of the visit was engaged in counseling and review of past data.    Follow-up in 6 months    The patient indicates understanding of these issues and agrees to the plan.    Kleber Turcios MD  9/5/2024  3:21 PM

## 2024-09-23 ENCOUNTER — TELEPHONE (OUTPATIENT)
Dept: INTERNAL MEDICINE CLINIC | Facility: CLINIC | Age: 69
End: 2024-09-23

## 2024-09-23 NOTE — TELEPHONE ENCOUNTER
Please fax Cologuard to [facility].      ( Note to self: Discussed with Terell Friday about colon cancer screening.  He wishes to pursue Cologuard.  Will send order to [facility]    In addition I did ask him to come back for a quick visit just to see his back where there was an abnormality seen on CT scan last year.  This may be a subtle cutaneous cyst.  He knows to come back sometime in the near future and I can take a quick look at his back.)

## 2024-11-15 ENCOUNTER — LAB ENCOUNTER (OUTPATIENT)
Dept: LAB | Age: 69
End: 2024-11-15
Attending: INTERNAL MEDICINE
Payer: MEDICARE

## 2024-11-15 ENCOUNTER — TELEPHONE (OUTPATIENT)
Dept: INTERNAL MEDICINE CLINIC | Facility: CLINIC | Age: 69
End: 2024-11-15

## 2024-11-15 DIAGNOSIS — E78.5 HYPERLIPIDEMIA, UNSPECIFIED HYPERLIPIDEMIA TYPE: ICD-10-CM

## 2024-11-15 LAB
ALT SERPL-CCNC: 31 U/L
AST SERPL-CCNC: 21 U/L (ref ?–34)
CHOLEST SERPL-MCNC: 120 MG/DL (ref ?–200)
FASTING PATIENT LIPID ANSWER: YES
HDLC SERPL-MCNC: 44 MG/DL (ref 40–59)
LDLC SERPL CALC-MCNC: 66 MG/DL (ref ?–100)
NONHDLC SERPL-MCNC: 76 MG/DL (ref ?–130)
TRIGL SERPL-MCNC: 40 MG/DL (ref 30–149)
VLDLC SERPL CALC-MCNC: 6 MG/DL (ref 0–30)

## 2024-11-15 PROCEDURE — 80061 LIPID PANEL: CPT

## 2024-11-15 PROCEDURE — 84460 ALANINE AMINO (ALT) (SGPT): CPT

## 2024-11-15 PROCEDURE — 36415 COLL VENOUS BLD VENIPUNCTURE: CPT

## 2024-11-15 PROCEDURE — 84450 TRANSFERASE (AST) (SGOT): CPT

## 2024-11-15 NOTE — TELEPHONE ENCOUNTER
We can let Terell know that his cholesterol is much better on the statin.  I am very pleased with the improvement.  Continue the rosuvastatin as is.

## 2024-11-15 NOTE — TELEPHONE ENCOUNTER
To Dr STRONG,    Pt is asking if you could please send cholesterol results to [facility].    I discussed with Dr STRONG and he stated I may fax pt's cholesterol labs to Dr Clement F# [phone]    I called pt to inform him that I faxed labs with fax confirmation received

## 2025-01-20 ENCOUNTER — LAB ENCOUNTER (OUTPATIENT)
Dept: LAB | Age: 70
End: 2025-01-20
Attending: UROLOGY
Payer: MEDICARE

## 2025-01-20 DIAGNOSIS — C61 PROSTATE CANCER (HCC): ICD-10-CM

## 2025-01-20 LAB — PSA SERPL-MCNC: <0.04 NG/ML (ref ?–4)

## 2025-01-20 PROCEDURE — 36415 COLL VENOUS BLD VENIPUNCTURE: CPT

## 2025-01-20 PROCEDURE — 84153 ASSAY OF PSA TOTAL: CPT

## 2025-01-23 ENCOUNTER — OFFICE VISIT (OUTPATIENT)
Dept: SURGERY | Facility: CLINIC | Age: 70
End: 2025-01-23

## 2025-01-23 ENCOUNTER — TELEPHONE (OUTPATIENT)
Dept: SURGERY | Facility: CLINIC | Age: 70
End: 2025-01-23

## 2025-01-23 DIAGNOSIS — N52.31 ERECTILE DYSFUNCTION AFTER RADICAL PROSTATECTOMY: ICD-10-CM

## 2025-01-23 DIAGNOSIS — C61 PROSTATE CANCER (HCC): Primary | ICD-10-CM

## 2025-01-23 PROBLEM — R97.20 ELEVATED PSA: Status: RESOLVED | Noted: 2023-02-20 | Resolved: 2025-01-23

## 2025-01-23 PROCEDURE — 1159F MED LIST DOCD IN RCRD: CPT | Performed by: UROLOGY

## 2025-01-23 PROCEDURE — 99214 OFFICE O/P EST MOD 30 MIN: CPT | Performed by: UROLOGY

## 2025-01-23 RX ORDER — SILDENAFIL 100 MG/1
TABLET, FILM COATED ORAL
Qty: 10 TABLET | Refills: 2 | Status: SHIPPED | OUTPATIENT
Start: 2025-01-23

## 2025-01-23 NOTE — TELEPHONE ENCOUNTER
Spoke with patients spouse, patient has an appointment this afternoon and  had a sooner opening for this morning. She states he is at work. Will keep appointment as scheduled.

## 2025-01-24 NOTE — PROGRESS NOTES
[facility]  Follow-Up Visit    HPI: Terell Urbano is a 69 year old male presents for a follow up visit. Last seen 7/29/2024. Accompanied by his wife.    INTERVAL HISTORY: Prostate cancer, status post RALP + PLND 9/23/23.    Final pathology revealing bilateral acinar prostatic adenocarcinoma, ángel score 3+4=7 with tertiary ángel pattern 5.  No EPE, SVI or LVI.  + PNI.  Negative margins.  2 lymph nodes negative. pT2 N0 R0    Initial post-prostatectomy PSA level from 10/23/2023 undetectable at <0.01 ng/mL.    PSA from 1/20/25 undetectable at <0.04 ng/mL.    He is doing Kegel exercises at home.  Completed pelvic floor PT. Denies incontinence or pad use.    Voiding with a good stream.    No meaningful erections since surgery.  Would like to discuss options.       1. Clinically Localized Intermediate Risk Prostate Cancer (pT2 pN0 cM0 R0)  No family history of prostate cancer.     Routine prostate cancer screening performed by patient's PCP.  His PSA from 2/9/2023 was noted to be elevated to 4.65 ng/mL.  This is increased from 2.49 ng/mL in 5/2019.     Repeat PSA 4/4/2023 remained elevated at 4.3 ng/mL.     Patient denies any significant LUTS.  He denies gross hematuria or dysuria.  No sensation of incomplete bladder emptying, straining to urinate, or weak stream.     Denies any significant erectile dysfunction.     No bone pain, fevers or chills, chest pain or shortness of breath.    Prostate MRI 6/2023 revealed a PI-RADS 4 lesion in the right PZ mid gland, suspicious for clinically significant prostate cancer.     Patient underwent a UroNav fusion prostate biopsy 7/14/2023 which revealed:  - Prostate volume of TRUS: 29.5 cc.  - PSA density: 0.15 ng/mL/cc  - GG 2 ([place] 3+4=7) Prostate AdenoCa in 3/3 cores from MANDEEP, 30% involvement, + PNI.  - GG 2 ([place] 3+4=7) PCa in 2/12 sextant cores (RM), 25% involvement, + PNI.  - GG 1 (Ángel 3+3=6) PCa in 3/12 sextant cores (RB, RA), up to 5% involvement, +  PNI.    Staging nuclear medicine bone scan and CT of the abdomen and pelvis did not reveal any evidence of metastatic disease.    9/22/23 s/p RALP + PLND    Final pathology revealing bilateral acinar prostatic adenocarcinoma, ángel score 3+4=7 with tertiary ángel pattern 5.  No EPE, SVI or LVI.  + PNI.  Negative margins.  2 lymph nodes negative. pT2 N0 R0    - 10/26/23 F/U: PSA undetectable.  1-2 saturated pads per day.  No erections.  Strong stream.  Start PFPT.    - 4/2024 F/U: PSA 0.05 ng/mL.  Did not pursue pelvic floor PT.  1-2 pads per day.  No erections.  Strong stream.    - 7/2024 follow-up.  PSA undetectable.  Completing pelvic floor PT.  1 safety pad per day.  Voiding with a strong stream.  No erections.  Would like to monitor ED.    - 1/2025 F/U: PSA undetectable.  Completed pelvic floor PT.  Denies incontinence or pad use.  Voiding with a strong stream.  No meaningful erections.  Trial of sildenafil as needed for ED.        PAST MEDICAL HISTORY: Hypertension (not on medication).  Prostate cancer 7/2023     PAST SURGICAL HISTORY: Hand surgery. RALP + PLND 9/2023.     SOCIAL HISTORY:  and has 3 grownup children.  No smoking or illicit drug use.  Rare social alcohol.  Works in [employer].     Reviewed past medical, surgical, family, and social history.  Reviewed med list and allergies.      REVIEW OF SYSTEMS:  Pertinent positives and negatives per HPI. A 12-point ROS was performed and is otherwise negative.       EXAM:  There were no vitals taken for this visit.    Physical Exam  Constitutional:       General: He is not in acute distress.     Appearance: He is well-developed.   HENT:      Head: Normocephalic.   Eyes:      General: No scleral icterus.  Cardiovascular:      Rate and Rhythm: Normal rate.   Pulmonary:      Effort: Pulmonary effort is normal. No respiratory distress.   Musculoskeletal:      Cervical back: Normal range of motion.   Skin:     General: Skin is warm and dry.   Neurological:       Mental Status: He is alert and oriented to person, place, and time.   Psychiatric:         Mood and Affect: Mood normal.         Behavior: Behavior normal.         Thought Content: Thought content normal.         Judgment: Judgment normal.       PATHOLOGY:    Authorizing Provider:  Jose Duran MD       Collected:           09/22/2023 09:20 AM           Ordering Location:     [facility]          Received:            09/22/2023 01:44 PM                                  Operating Room                                                               Pathologist:           Zoya Anderson MD                                                             Specimens:   A) - Yas prostatic fat, 1. yas prostatic fat                                                      B) - Prostate, 1. prostate, bilateral seminel vesicles, bilateral ampulla of vas                    C) - Lymph node pelvic left, 3. left pelvic lymph nodes                                              D) - Lymph node pelvic right, 4. right pelvic lymph nodes                                  Final Diagnosis:      A. Periprostatic fat; excision:   Fibroadipose tissue with vascular congestion.  No evidence of malignancy is seen.     B. Prostate, bilateral seminal vesicles and bilateral ampulla of vas; robotic assisted radical prostatectomy:   Bilateral acinar prostatic adenocarcinoma, Empire score 3+4 = 7, (grade group 2), (15% Empire 4 pattern) with territory Empire pattern 5 (less than 5%).  Dominant tumor nodule measures 2.1 cm in maximum dimension, (right posterior region).    Tumor is involving approximately 8% of the entire prostatic tissue.  No evidence of extra-prostatic tumor extension into the periprostatic fibroadipose tissue is identified.  All inked external specimen margins are free of tumor, with tumor showing a closest approach of 0.5 mm from the inked specimen margin in the right posterior region.  Multifocal intraprostatic tumor  perineural invasion present.  No evidence of tumor lymphovascular invasion identified.  Prostatic parenchyma uninvolved by tumor demonstrates glandular and stromal hyperplasia, and multifocal mild chronic prostatitis.   Bilateral seminal vesicles and vasa deferentia are negative for malignancy.  Pathologic TNM: pT2, N0 [See Prostate Cancer Case Summary].       C. Left pelvic lymph nodes; dissection:   Two benign reactive lymph nodes with fatty changes (0/2).  No evidence of metastatic carcinoma is seen.     D. Right pelvic lymph nodes; dissection:   Fibroadipose tissue with hemorrhage and vascular congestion.  No lymphoid tissue is identified.       Pathology                                Case: [id number]       Provider:  Jose Duran MD       Collected:           07/14/2023                 Location [place] Score % of Pattern 4  Grade Group   Positive Cores / Total Cores  Tumor Length (mm)  % Tissue Involved       A. Region of interest 3+4 10 2 3/3 20 30    B. Left base - -- -- -- -- -    C. Left mid - - - - - -    D. Left apex - - - - - -    E. Right base 3+3 - 1 2/2 4 5    F. Right mid 3+4 10 2 2/2 12 25    G. Right apex 3+3 - 1 1/2 3 3       LABS:      PSA   Latest Ref Rng <=4.00 ng/mL   8/19/2013 1.2    8/20/2014 1.3    3/16/2016 1.5    1/8/2018 2.6    5/23/2019 2.49    2/9/2023 4.65 (H)   4/4/2023 4.30 (H)    10/23/2023 < 0.01   4/22/2024 0.05   7/25/2024 <0.04   1/2025 <0.04       IMAGING:    NM BONE SCAN WB (8/2/2023): No evidence for metastatic disease to the bone.     CT ABDOMEN+PELVIS (8/2/2023): 1. No metastatic disease is identified within the abdomen or pelvis. No adenopathy is seen within the abdomen or pelvis. 2. There is a dermal nodule within the cutaneous fat at the right paracentral back posteriorly at the level of L1. Clinical correlation with physical exam is suggested.      UROLOGY PROCEDURE:  None performed today.       IMPRESSION:  69 year old male who is s/p RALP + PLND on  9/22/23    PSA levels continue to be undetectable.    Reviewed with patient.  Results are reassuring.  No indication for any further treatment at this point.  Recommend continued surveillance with repeat PSA in 6 months.    Regarding urinary continence: Excellent recovery after completing pelvic floor PT.  Denies any significant incontinence or problems.  Recommend continuing home exercise program.    Discussed post-prostatectomy erectile dysfunction.  Management options reviewed,including oral 5 PDE inhibitors, ICI therapy, vacuum erection device, urethral gel/suppositories, penile prosthetic surgery.  We discussed benefits, risks, side effects, alternatives of treatment.    Patient is interested in trying oral 5 PDE inhibitors.    All questions answered.       PLAN:  1.  Continue Kegel exercises.    2.  Prostate cancer surveillance with repeat PSA in 6 months.    3.  Start trial of sildenafil 50 to 100 mg as needed for ED.    RTC for follow-up in 6 months with a PSA prior to office visit.      Jose Duran MD  1/23/2025

## 2025-03-06 ENCOUNTER — OFFICE VISIT (OUTPATIENT)
Dept: INTERNAL MEDICINE CLINIC | Facility: CLINIC | Age: 70
End: 2025-03-06

## 2025-03-06 VITALS
DIASTOLIC BLOOD PRESSURE: 80 MMHG | SYSTOLIC BLOOD PRESSURE: 134 MMHG | HEART RATE: 86 BPM | TEMPERATURE: 98 F | HEIGHT: 72 IN | WEIGHT: 277 LBS | BODY MASS INDEX: 37.52 KG/M2 | OXYGEN SATURATION: 98 %

## 2025-03-06 DIAGNOSIS — E78.5 HYPERLIPIDEMIA, UNSPECIFIED HYPERLIPIDEMIA TYPE: ICD-10-CM

## 2025-03-06 DIAGNOSIS — R93.1 ELEVATED CORONARY ARTERY CALCIUM SCORE: Primary | ICD-10-CM

## 2025-03-06 DIAGNOSIS — L72.3 SEBACEOUS CYST: ICD-10-CM

## 2025-03-06 DIAGNOSIS — C61 PROSTATE CANCER (HCC): ICD-10-CM

## 2025-03-06 DIAGNOSIS — D22.9 NUMEROUS MOLES: ICD-10-CM

## 2025-03-06 DIAGNOSIS — Z12.11 COLON CANCER SCREENING: ICD-10-CM

## 2025-03-06 DIAGNOSIS — I10 PRIMARY HYPERTENSION: ICD-10-CM

## 2025-03-06 DIAGNOSIS — Z00.00 ROUTINE HEALTH MAINTENANCE: ICD-10-CM

## 2025-03-06 PROCEDURE — 99214 OFFICE O/P EST MOD 30 MIN: CPT | Performed by: INTERNAL MEDICINE

## 2025-03-06 PROCEDURE — 1126F AMNT PAIN NOTED NONE PRSNT: CPT | Performed by: INTERNAL MEDICINE

## 2025-03-06 PROCEDURE — 1159F MED LIST DOCD IN RCRD: CPT | Performed by: INTERNAL MEDICINE

## 2025-03-06 RX ORDER — ASPIRIN 81 MG/1
81 TABLET ORAL DAILY
COMMUNITY

## 2025-03-06 NOTE — PROGRESS NOTES
Terell Urbano is a 70 year old male.  HPI:     Chief Complaint   Patient presents with    Checkup      Terell presents for 6-month checkup    He feels well.  He has no unusual complaints.    His blood pressure is acceptable.    He is on statin.  Recent lipids done November showed cholesterol 120.  HDL 44.  LDL 66.    He last saw cardiology 2024.  This was Dr. Clement.  Known to have a coronary artery calcium score of 569.  Ischemic evaluation unremarkable.  He is on statin.  He has to follow-up with him in 6 months after last.  Terell knows about this.    I did check his back.  He is got a 2 cm sebaceous cyst lower back.  This is not infected.  We talked about this.  We spoke that it might eventually get infected and he knows about this.  In his left upper back he is got a 4 mm dark brown mole.  I did tell him that this should be evaluated by dermatology.  He has a dermatologist Dr. Kaela Valenzuela who he sees and will make an appointment for her to reexamine his moles.    He does see Dr. Duran from urology.  He does have a history of prostatectomy for prostate cancer.  I did review Dr. Duran's note from 2025.  CT scan did not show any evidence of metastatic disease from 2023.  Bone scan 2023 was negative for metastatic disease.  PSA 2025 was less than 0.04.  He will see Terell again in 6 months with follow-up PSA.    We talked about vaccines.  We talked about the availability of flu vaccine COVID booster Shingrix and PCV 20.    Did get his Cologuard kit at home and he indicates he will turn that in.  I did ask him to check to make sure it is not .    For about a year he has had episodes where his arms feel weak.  Right greater than left.  This usually happens when he is at work.  It was more frequent earlier when this all started about a year ago.  It seems to be associated with activity.  At that time that he will get arm weakness his neck will  hurt.  Arm weakness last for about 2 minutes and then goes away.  It is much less frequent now and may happen just once a month.  I talked to him about this.  I do not think this is ischemia.  I think this may be related to cervical arthritis.  May be cervical spinal stenosis.  He has good handgrip and upper arm strength.  We talked about getting an x-ray but both of us feel that is not needed for now.  He knows to call me if symptoms progress or worsen.  Current Outpatient Medications   Medication Sig Dispense Refill    aspirin 81 MG Oral Tab EC Take 1 tablet (81 mg total) by mouth daily.      Sildenafil Citrate 100 MG Oral Tab Take 0.5-1 tablets ( mg total) by mouth daily as needed for Erectile Dysfunction. 10 tablet 2    AMLODIPINE 5 MG Oral Tab TAKE 1 TABLET (5 MG TOTAL) BY MOUTH DAILY. 30 tablet 11    rosuvastatin (CRESTOR) 10 MG Oral Tab Take 1 tablet (10 mg total) by mouth nightly. 30 tablet 11      Past Medical History:    Cancer (HCC)    prostate    Cellulitis    as per NG    Hemorrhoids    as per NG    High blood pressure    Lipid screening    as per NG    Migraine    as per NG    Visual impairment    glasses      Social History:  Social History     Socioeconomic History    Marital status:    Tobacco Use    Smoking status: Never    Smokeless tobacco: Never   Vaping Use    Vaping status: Never Used   Substance and Sexual Activity    Alcohol use: No    Drug use: No   Other Topics Concern    Caffeine Concern Yes     Comment: Coffee, 3 cups; as per NG        REVIEW OF SYSTEMS:   GENERAL HEALTH:  feels well otherwise  RESPIRATORY:  Voices no shortness of breath with exertion or cough  CARDIOVASCULAR:  Voices no chest pain on exertion or shortness of breath  GI:   Voices no abdominal pain or changes of bowels   :Viices no urning or frequency of urination.  NEURO:  Voices no  headaches or dizziness    EXAM:   /80   Pulse 86   Temp 97.7 °F (36.5 °C) (Oral)   Ht 6' (1.829 m)   Wt 277 lb  (125.6 kg)   SpO2 98%   BMI 37.57 kg/m²     GENERAL:  well developed, well nourished, in no apparent distress  SKIN: See above.  Several moles on back.  Left upper shoulder area mole is 4 mm and dark brown.  Regular edges.  However I did ask him to get this checked by dermatology Dr. Kaela Valenzuela.  HEENT: atraumatic.  Pharynx normal without exudate.  EYES:  PERRL. Sclera anicteric.  NECK:  Supple,  no adenopathy,  thyroid normal  LUNGS:  clear to auscultation.  Effort normal  CARDIO:  RRR without murmur.   S1 and S2 normal  GI:  good BS's,  no masses,   HSM or tenderness  EXTREMITIES : no cyanosis, clubbing or edema    ASSESSMENT AND PLAN:     1. Elevated coronary artery calcium score  Elevated coronary artery calcium score.  On statin.  Lipids at goal.    2. Prostate cancer (HCC)  Prostate cancer.  Followed closely by Dr. Duran.  Status post prostatectomy.    3. Primary hypertension  Blood pressure under satisfactory control for now.    4. Hyperlipidemia, unspecified hyperlipidemia type  Hyperlipidemia.  On statin.  Lipids at goal.    5. Colon cancer screening  He will turn in his Cologuard.    6. Routine health maintenance  See above for vaccines.  He indicates he will discuss these vaccines with his wife.    7. Numerous moles  Follow-up with Dr. Kaela Kathleen.    8. Sebaceous cyst  For now we will observe.  He knows to call if it gets infected.    I did notify him that I will be retiring June 13.  He knows about resources from a website and phone number that he can call to establish himself with a new PCP.  However he does have a PCP in mind and will call with PCP for an appointment so that there is continuity of care.  Follow-up with new PCP in 6 months.     The patient indicates understanding of these issues and agrees to the plan.    Kleber Turcios MD  3/6/2025  4:08 PM

## 2025-04-03 ENCOUNTER — OFFICE VISIT (OUTPATIENT)
Dept: SURGERY | Facility: CLINIC | Age: 70
End: 2025-04-03

## 2025-04-03 DIAGNOSIS — M12.241 PIGMENTED VILLONODULAR SYNOVITIS OF HAND, RIGHT: Primary | ICD-10-CM

## 2025-04-03 PROCEDURE — 99205 OFFICE O/P NEW HI 60 MIN: CPT | Performed by: PLASTIC SURGERY

## 2025-04-03 PROCEDURE — 1126F AMNT PAIN NOTED NONE PRSNT: CPT | Performed by: PLASTIC SURGERY

## 2025-04-03 PROCEDURE — 1159F MED LIST DOCD IN RCRD: CPT | Performed by: PLASTIC SURGERY

## 2025-04-03 RX ORDER — HYDROCODONE BITARTRATE AND ACETAMINOPHEN 7.5; 325 MG/1; MG/1
TABLET ORAL
Qty: 25 TABLET | Refills: 0 | Status: SHIPPED | OUTPATIENT
Start: 2025-04-03

## 2025-04-03 NOTE — PROGRESS NOTES
Patient request for surgery signed by patient and witnessed and signed by RN.  Prescription for Norco electronically sent to pharmacy per Dr. Arshad's order and patient instructed to  prescription before surgery.   Pre-Surgical Instruction Handout, Hand Elevation Handout, Dressing Protector Handout, and Post-Operative Instruction Handout given to and reviewed w/patient.  All questions and concerns answered; pt verbalized an understanding of all pre-operative teaching.  Patient instructed to call the office with any further questions and/or concerns.  Patient escorted to surgery scheduling to schedule surgery and post-operative appointments.

## 2025-04-03 NOTE — H&P
Terell Urbano is a 70 year old male that presents with   Chief Complaint   Patient presents with    Ganglion     RIF distal    .    REFERRED BY:  Kleber Turcios    Pacemaker: No  Latex Allergy: no  Coumadin: No  Plavix: No  Other anticoagulants: No  Diet medication: No  Cardiac stents: No    HAND DOMINANCE:  Right    Profession: semi retired printer    RECONSTRUCTIVE HISTORY    SUN EXPOSURE   Current no   Past no   Sunburns no   Tanning salons current no   Tanning salons past no     SKIN CANCER    Personal history of skin cancer: none      HPI:       70-year-old male right-hand-dominant with an RIF mass    3 years duration    Increase in size    No pain or functional problems    No trauma      Review of Systems:   Constitutional: No change in appetite, chill/rigors, or fatigue  GI: No jaundice  Endocrine: No generalized weakness  Neurological: No aphasia, loss of consciousness, or seizures    Musculoskeletal:     Duration  3 years    Location:       Right  index finger             Size: increased    Pain:  no    Functional problems:  no      PMH:     MEDICAL  Past Medical History:    Cancer (HCC)    prostate    Cellulitis    as per NG    Hemorrhoids    as per NG    High blood pressure    Lipid screening    as per NG    Migraine    as per NG    Visual impairment    glasses        SURGICAL  Past Surgical History:   Procedure Laterality Date    Electrocardiogram, complete  09-    Scanned to Media Tab - 09-    Hand/finger surgery unlisted      as per NG    Laparoscopy, surgical prostatectomy, retropubic radical, w/nerve sparing  09/22/2023    RALP + PLND. Dr. Duran    Us biopsy prostate uronav (xft=[id number])  07/14/2023        ALLERIGIES  Allergies[1]     MEDICATIONS  Current Outpatient Medications   Medication Sig Dispense Refill    aspirin 81 MG Oral Tab EC Take 1 tablet (81 mg total) by mouth daily.      Sildenafil Citrate 100 MG Oral Tab Take 0.5-1 tablets ( mg total) by mouth  daily as needed for Erectile Dysfunction. 10 tablet 2    AMLODIPINE 5 MG Oral Tab TAKE 1 TABLET (5 MG TOTAL) BY MOUTH DAILY. 30 tablet 11    rosuvastatin (CRESTOR) 10 MG Oral Tab Take 1 tablet (10 mg total) by mouth nightly. 30 tablet 11        SOCIAL HISTORY  Social History     Socioeconomic History    Marital status:    Tobacco Use    Smoking status: Never    Smokeless tobacco: Never   Vaping Use    Vaping status: Never Used   Substance and Sexual Activity    Alcohol use: No    Drug use: No   Other Topics Concern    Caffeine Concern Yes     Comment: Coffee, 3 cups; as per NG    Right Handed Yes        FAMILY HISTORY  Family History   Problem Relation Age of Onset    Heart Disease Father         CAD, as per NG    Heart Attack Father 73        Cause of death; as per NG    Lipids Father         as per NG    Heart Disease Mother         Swelling suggestive of Congestive Heart Failure; as per NG    Pulmonary Disease Sister 62        COPD; Cause of death; as per NG          PHYSICAL EXAM:     CONSTITUTIONAL: Overall appearance - Normal  HEENT: Normocephalic  EYES: Conjunctiva - Right: Normal, Left: Normal; EOMI  EARS: Inspection - Right: Normal, Left: Normal  NECK/THYROID: Inspection - Normal, Palpation - Normal, Thyroid gland - Normal, No adenopathy  RESPIRATORY: Inspection - Normal, Effort - Normal  CARDIOVASCULAR: Regular rhythm, No murmurs  ABDOMEN: Inspection - Normal, No abdominal tenderness  NEURO: Memory intact  PSYCH: Oriented to person, place, time, and situation, Appropriate mood and affect      Hand Physical Exam:     RIF multilobulated nontender mass  Dorsal aspect 2 x 15 extending radially and volar to the volar extent: 2.5 x 2.5 cm  No tenderness  No lag  FDS, FDP, central slip, terminal slip intact  PIP/DIP   RCL/UCL intact    ASSESSMENT/PLAN:     GIANT CELL TUMOR: RIF    We discussed what a giant cell tumor is, including treatment options.  Questions were answered and the patient wishes to  proceed with treatment.     This mass should be excised.  I discussed the procedure at length, including post-operative course and risks as indicated on the Surgical Request Form.  Therapy will be necessary post-operatively.  The mass could recur (5-10%).     POST-OPERATIVE  PROTOCOL:  We discussed post-operative restrictions at length.  It is critical to maintain the splint post-operatively and to comply with post-operative instructions.  The splint must be carefully cared for, must remain dry,  and cannot be removed under any circumstance.  Consistent elevation of the hand above heart level is critical.  Therapy will be necessary post-operatively.  Failure to comply with post-operative instructions, including therapy, will have significant impact on the final result, and could lead to permanent pain, stiffness, weakness, and loss of function.    Even with satisfactory healing, the hand/digit may not regain normal ROM or normal function.    RISKS:     Bleeding  Infection  Scar  Pain  Stiffness  Weakness  Loss of function  Recurrence  Anesthesia risks      VOLAR AND DORSAL INCISIONS    4/3/2025  Juan Manuel Arshad MD    [facility] Data Reviewed.               +++++++++++++++++++++++++++++++++++++++++++++++++    MEDICAL DECISION MAKING    PROBLEMS      HIGH    (number / complexity)          Undiagnosed new problem with uncertain prognosis with threat to bodily function    DATA         STRAIGHTFORWARD    (amount / complexity)           MANAGEMENT RISK  HIGH    (complications/ morbidity)       Major surgery with risk factors                  MDM LEVEL    HIGH            [1] No Known Allergies

## 2025-04-03 NOTE — H&P (VIEW-ONLY)
Terell Urbano is a 70 year old male that presents with   Chief Complaint   Patient presents with    Ganglion     RIF distal    .    REFERRED BY:  Kleber Turcios    Pacemaker: No  Latex Allergy: no  Coumadin: No  Plavix: No  Other anticoagulants: No  Diet medication: No  Cardiac stents: No    HAND DOMINANCE:  Right    Profession: semi retired printer    RECONSTRUCTIVE HISTORY    SUN EXPOSURE   Current no   Past no   Sunburns no   Tanning salons current no   Tanning salons past no     SKIN CANCER    Personal history of skin cancer: none      HPI:       70-year-old male right-hand-dominant with an RIF mass    3 years duration    Increase in size    No pain or functional problems    No trauma      Review of Systems:   Constitutional: No change in appetite, chill/rigors, or fatigue  GI: No jaundice  Endocrine: No generalized weakness  Neurological: No aphasia, loss of consciousness, or seizures    Musculoskeletal:     Duration  3 years    Location:       Right  index finger             Size: increased    Pain:  no    Functional problems:  no      PMH:     MEDICAL  Past Medical History:    Cancer (HCC)    prostate    Cellulitis    as per NG    Hemorrhoids    as per NG    High blood pressure    Lipid screening    as per NG    Migraine    as per NG    Visual impairment    glasses        SURGICAL  Past Surgical History:   Procedure Laterality Date    Electrocardiogram, complete  09-    Scanned to Media Tab - 09-    Hand/finger surgery unlisted      as per NG    Laparoscopy, surgical prostatectomy, retropubic radical, w/nerve sparing  09/22/2023    RALP + PLND. Dr. Duran    Us biopsy prostate uronav (tdz=[id number])  07/14/2023        ALLERIGIES  Allergies[1]     MEDICATIONS  Current Outpatient Medications   Medication Sig Dispense Refill    aspirin 81 MG Oral Tab EC Take 1 tablet (81 mg total) by mouth daily.      Sildenafil Citrate 100 MG Oral Tab Take 0.5-1 tablets ( mg total) by mouth  daily as needed for Erectile Dysfunction. 10 tablet 2    AMLODIPINE 5 MG Oral Tab TAKE 1 TABLET (5 MG TOTAL) BY MOUTH DAILY. 30 tablet 11    rosuvastatin (CRESTOR) 10 MG Oral Tab Take 1 tablet (10 mg total) by mouth nightly. 30 tablet 11        SOCIAL HISTORY  Social History     Socioeconomic History    Marital status:    Tobacco Use    Smoking status: Never    Smokeless tobacco: Never   Vaping Use    Vaping status: Never Used   Substance and Sexual Activity    Alcohol use: No    Drug use: No   Other Topics Concern    Caffeine Concern Yes     Comment: Coffee, 3 cups; as per NG    Right Handed Yes        FAMILY HISTORY  Family History   Problem Relation Age of Onset    Heart Disease Father         CAD, as per NG    Heart Attack Father 73        Cause of death; as per NG    Lipids Father         as per NG    Heart Disease Mother         Swelling suggestive of Congestive Heart Failure; as per NG    Pulmonary Disease Sister 62        COPD; Cause of death; as per NG          PHYSICAL EXAM:     CONSTITUTIONAL: Overall appearance - Normal  HEENT: Normocephalic  EYES: Conjunctiva - Right: Normal, Left: Normal; EOMI  EARS: Inspection - Right: Normal, Left: Normal  NECK/THYROID: Inspection - Normal, Palpation - Normal, Thyroid gland - Normal, No adenopathy  RESPIRATORY: Inspection - Normal, Effort - Normal  CARDIOVASCULAR: Regular rhythm, No murmurs  ABDOMEN: Inspection - Normal, No abdominal tenderness  NEURO: Memory intact  PSYCH: Oriented to person, place, time, and situation, Appropriate mood and affect      Hand Physical Exam:     RIF multilobulated nontender mass  Dorsal aspect 2 x 15 extending radially and volar to the volar extent: 2.5 x 2.5 cm  No tenderness  No lag  FDS, FDP, central slip, terminal slip intact  PIP/DIP   RCL/UCL intact    ASSESSMENT/PLAN:     GIANT CELL TUMOR: RIF    We discussed what a giant cell tumor is, including treatment options.  Questions were answered and the patient wishes to  proceed with treatment.     This mass should be excised.  I discussed the procedure at length, including post-operative course and risks as indicated on the Surgical Request Form.  Therapy will be necessary post-operatively.  The mass could recur (5-10%).     POST-OPERATIVE  PROTOCOL:  We discussed post-operative restrictions at length.  It is critical to maintain the splint post-operatively and to comply with post-operative instructions.  The splint must be carefully cared for, must remain dry,  and cannot be removed under any circumstance.  Consistent elevation of the hand above heart level is critical.  Therapy will be necessary post-operatively.  Failure to comply with post-operative instructions, including therapy, will have significant impact on the final result, and could lead to permanent pain, stiffness, weakness, and loss of function.    Even with satisfactory healing, the hand/digit may not regain normal ROM or normal function.    RISKS:     Bleeding  Infection  Scar  Pain  Stiffness  Weakness  Loss of function  Recurrence  Anesthesia risks      VOLAR AND DORSAL INCISIONS    4/3/2025  Juan Manuel Arshad MD    [facility] Data Reviewed.               +++++++++++++++++++++++++++++++++++++++++++++++++    MEDICAL DECISION MAKING    PROBLEMS      HIGH    (number / complexity)          Undiagnosed new problem with uncertain prognosis with threat to bodily function    DATA         STRAIGHTFORWARD    (amount / complexity)           MANAGEMENT RISK  HIGH    (complications/ morbidity)       Major surgery with risk factors                  MDM LEVEL    HIGH            [1] No Known Allergies

## 2025-04-29 ENCOUNTER — TELEPHONE (OUTPATIENT)
Dept: INTERNAL MEDICINE CLINIC | Facility: CLINIC | Age: 70
End: 2025-04-29

## 2025-04-29 NOTE — TELEPHONE ENCOUNTER
Terell can stop the aspirin for what ever time they need.  He can stop it for 7 days which should be plenty of time for it to be out of the system.

## 2025-04-29 NOTE — TELEPHONE ENCOUNTER
Chelsie from [facility] called.  She said the pt. Is having a mass removed from his rt. Index finger on 5/2/25. .  They are asking if the pt. Could stop taking the 81 mg aspirin and if so, for how long?  Please call the pt. With directions.

## 2025-05-01 ENCOUNTER — HOSPITAL ENCOUNTER (OUTPATIENT)
Age: 70
Discharge: HOME | End: 2025-05-01
Payer: MEDICARE

## 2025-05-01 ENCOUNTER — ANESTHESIA EVENT (OUTPATIENT)
Dept: SURGERY | Facility: HOSPITAL | Age: 70
End: 2025-05-01
Payer: MEDICARE

## 2025-05-01 VITALS
OXYGEN SATURATION: 96 % | SYSTOLIC BLOOD PRESSURE: 142 MMHG | DIASTOLIC BLOOD PRESSURE: 94 MMHG | HEART RATE: 80 BPM | TEMPERATURE: 98 F | RESPIRATION RATE: 18 BRPM

## 2025-05-01 DIAGNOSIS — S61.431A PUNCTURE WOUND OF RIGHT HAND WITHOUT FOREIGN BODY, INITIAL ENCOUNTER: Primary | ICD-10-CM

## 2025-05-01 PROCEDURE — 99213 OFFICE O/P EST LOW 20 MIN: CPT | Performed by: PHYSICIAN ASSISTANT

## 2025-05-01 NOTE — ED PROVIDER NOTES
Chief Complaint   Patient presents with    Skin Problem    Wound Care       HPI:     Terell Urbano is a 70 year old male who presents for evaluation of bleeding along the right hand onset today.  Notes previous blood blister developed over 2 weeks ago incidentally brushing her arm against the side of a counter.  Bleeding has been controlled with bandages up until today, patient is pending surgery tomorrow for a nodular synovitis through local hand specialist.  Patient denies blood thinners beyond aspirin.  Denies numbness or tingling or associated pain fevers or chills.      PFSH    PFSH asessment screens reviewed and agree.  Nurses notes reviewed I agree with documentation.    Family History[1]  Family history reviewed with patient/caregiver and is not pertinent to presenting problem.  Social History     Socioeconomic History    Marital status:      Spouse name: Not on file    Number of children: Not on file    Years of education: Not on file    Highest education level: Not on file   Occupational History    Not on file   Tobacco Use    Smoking status: Never    Smokeless tobacco: Never   Vaping Use    Vaping status: Never Used   Substance and Sexual Activity    Alcohol use: No    Drug use: No    Sexual activity: Not on file   Other Topics Concern     Service Not Asked    Blood Transfusions Not Asked    Caffeine Concern Yes     Comment: Coffee, 3 cups; as per NG    Occupational Exposure Not Asked    Hobby Hazards Not Asked    Sleep Concern Not Asked    Stress Concern Not Asked    Weight Concern Not Asked    Special Diet Not Asked    Back Care Not Asked    Exercise Not Asked    Bike Helmet Not Asked    Seat Belt Not Asked    Self-Exams Not Asked    Left Handed Not Asked    Right Handed Yes    Currently spends a great deal of time in the sun Not Asked    Past Sunlamp Treatments for Acne Not Asked    History of tanning Not Asked    Hx of Spending Great Deal of Time in Sun Not Asked    Bad sunburns  in the past Not Asked    Tanning Salons in the Past Not Asked    Hx of Radiation Treatments Not Asked    Regular use of sun block Not Asked   Social History Narrative    Not on file     Social Drivers of Health     Food Insecurity: Not on file   Transportation Needs: Not on file   Housing Stability: Not on file         ROS:   Positive for stated complaint: Hand bleeding.  All other systems reviewed and negative except as noted above.  Constitutional and Vital Signs Reviewed.      Physical Exam:     Findings:    BP (!) 142/94   Pulse 80   Temp 98.4 °F (36.9 °C) (Oral)   Resp 18   SpO2 96%   GENERAL: well developed, well nourished, well hydrated, no distress  SKIN: good skin turgor, no obvious rashes  EXTREMITIES: Superficial wound along the right hypothenar eminence proximal aspect.  Mild slight capillary leak without active hemorrhage, no erythema no warmth.  No cyanosis or edema. STODDARD without difficulty  HEAD: normocephalic, atraumatic  EYES: sclera non icteric bilateral, conjunctiva clear  NEURO: No focal deficits  PSYCH: Alert and oriented x3.  Answering questions appropriately.  Mood appropriate.    MDM/Assessment/Plan:   Orders for this encounter:    No orders of the defined types were placed in this encounter.      Labs performed this visit:  No results found for this or any previous visit (from the past 10 hours).    MDM:  Surgicel and pressure dressing applied, positive hemostasis.  Instructed to keep in place until preop tomorrow with further guidance through hand surgeon. Instructed on changes warranting emergent reevaluation.  Neurovascular intact.        Diagnosis:    ICD-10-CM    1. Puncture wound of right hand without foreign body, initial encounter  S61.431A           All results reviewed and discussed with patient.  See AVS for detailed discharge instructions for your condition today.    Follow Up with:  Juan Manuel Nesbitt MD  [facility] [address]  [phone]      READDRESS  TOMORROW PRE OP. GO TO ER FOR BREAKTHROUGH BLEEDING/CONCERNS.         [1]   Family History  Problem Relation Age of Onset    Heart Disease Father         CAD, as per NG    Heart Attack Father 73        Cause of death; as per NG    Lipids Father         as per NG    Heart Disease Mother         Swelling suggestive of Congestive Heart Failure; as per NG    Pulmonary Disease Sister 62        COPD; Cause of death; as per NG

## 2025-05-01 NOTE — ED INITIAL ASSESSMENT (HPI)
Scheduled to get surgery for a nodular synovitis of R hand, reports bleeding at site that started 4/20 and has been bleeding intermittently since then.

## 2025-05-01 NOTE — DISCHARGE INSTRUCTIONS
KEEP DRESSING IN PLACE UNTIL TOMORROW. APPLY BANDAGE PROVIDED IF BLEEDING THROUGH. SEEK IMMEDIATE RE EVALUATION FOR FURTHER BLEEDING.

## 2025-05-02 ENCOUNTER — HOSPITAL DOCUMENTATION (OUTPATIENT)
Dept: SURGERY | Facility: CLINIC | Age: 70
End: 2025-05-02

## 2025-05-02 ENCOUNTER — ANESTHESIA (OUTPATIENT)
Dept: SURGERY | Facility: HOSPITAL | Age: 70
End: 2025-05-02
Payer: MEDICARE

## 2025-05-02 ENCOUNTER — HOSPITAL ENCOUNTER (OUTPATIENT)
Facility: HOSPITAL | Age: 70
Setting detail: HOSPITAL OUTPATIENT SURGERY
Discharge: HOME | End: 2025-05-02
Attending: PLASTIC SURGERY | Admitting: PLASTIC SURGERY
Payer: MEDICARE

## 2025-05-02 VITALS
DIASTOLIC BLOOD PRESSURE: 76 MMHG | BODY MASS INDEX: 37.79 KG/M2 | TEMPERATURE: 99 F | OXYGEN SATURATION: 93 % | HEIGHT: 72 IN | RESPIRATION RATE: 12 BRPM | WEIGHT: 279 LBS | HEART RATE: 83 BPM | SYSTOLIC BLOOD PRESSURE: 137 MMHG

## 2025-05-02 DIAGNOSIS — M12.241 PIGMENTED VILLONODULAR SYNOVITIS OF HAND, RIGHT: Primary | ICD-10-CM

## 2025-05-02 DIAGNOSIS — M12.241 PIGMENTED VILLONODULAR SYNOVITIS OF HAND, RIGHT: ICD-10-CM

## 2025-05-02 DIAGNOSIS — S61.200A UNSPECIFIED OPEN WOUND OF RIGHT INDEX FINGER WITHOUT DAMAGE TO NAIL, INITIAL ENCOUNTER: ICD-10-CM

## 2025-05-02 PROBLEM — Z04.9 OBSERVATION FOR SUSPECTED CONDITION: Status: ACTIVE | Noted: 2025-05-02

## 2025-05-02 RX ORDER — HYDROMORPHONE HYDROCHLORIDE 1 MG/ML
0.6 INJECTION, SOLUTION INTRAMUSCULAR; INTRAVENOUS; SUBCUTANEOUS EVERY 5 MIN PRN
Status: DISCONTINUED | OUTPATIENT
Start: 2025-05-02 | End: 2025-05-02

## 2025-05-02 RX ORDER — LIDOCAINE HYDROCHLORIDE 10 MG/ML
INJECTION, SOLUTION EPIDURAL; INFILTRATION; INTRACAUDAL; PERINEURAL AS NEEDED
Status: DISCONTINUED | OUTPATIENT
Start: 2025-05-02 | End: 2025-05-02 | Stop reason: SURG

## 2025-05-02 RX ORDER — CEFADROXIL 500 MG/1
500 CAPSULE ORAL 2 TIMES DAILY
Qty: 14 CAPSULE | Refills: 0 | Status: SHIPPED | OUTPATIENT
Start: 2025-05-02 | End: 2025-05-09

## 2025-05-02 RX ORDER — ACETAMINOPHEN 500 MG
1000 TABLET ORAL ONCE
Status: COMPLETED | OUTPATIENT
Start: 2025-05-02 | End: 2025-05-02

## 2025-05-02 RX ORDER — FAMOTIDINE 20 MG/1
20 TABLET, FILM COATED ORAL ONCE
Status: COMPLETED | OUTPATIENT
Start: 2025-05-02 | End: 2025-05-02

## 2025-05-02 RX ORDER — SODIUM CHLORIDE, SODIUM LACTATE, POTASSIUM CHLORIDE, CALCIUM CHLORIDE 600; 310; 30; 20 MG/100ML; MG/100ML; MG/100ML; MG/100ML
INJECTION, SOLUTION INTRAVENOUS CONTINUOUS
Status: DISCONTINUED | OUTPATIENT
Start: 2025-05-02 | End: 2025-05-02

## 2025-05-02 RX ORDER — GLYCOPYRROLATE 0.2 MG/ML
INJECTION, SOLUTION INTRAMUSCULAR; INTRAVENOUS AS NEEDED
Status: DISCONTINUED | OUTPATIENT
Start: 2025-05-02 | End: 2025-05-02 | Stop reason: SURG

## 2025-05-02 RX ORDER — FAMOTIDINE 10 MG/ML
20 INJECTION, SOLUTION INTRAVENOUS ONCE
Status: COMPLETED | OUTPATIENT
Start: 2025-05-02 | End: 2025-05-02

## 2025-05-02 RX ORDER — ONDANSETRON 2 MG/ML
INJECTION INTRAMUSCULAR; INTRAVENOUS AS NEEDED
Status: DISCONTINUED | OUTPATIENT
Start: 2025-05-02 | End: 2025-05-02 | Stop reason: SURG

## 2025-05-02 RX ORDER — NALOXONE HYDROCHLORIDE 0.4 MG/ML
0.08 INJECTION, SOLUTION INTRAMUSCULAR; INTRAVENOUS; SUBCUTANEOUS AS NEEDED
Status: DISCONTINUED | OUTPATIENT
Start: 2025-05-02 | End: 2025-05-02

## 2025-05-02 RX ORDER — DEXAMETHASONE SODIUM PHOSPHATE 4 MG/ML
VIAL (ML) INJECTION AS NEEDED
Status: DISCONTINUED | OUTPATIENT
Start: 2025-05-02 | End: 2025-05-02 | Stop reason: SURG

## 2025-05-02 RX ORDER — METOCLOPRAMIDE 10 MG/1
10 TABLET ORAL ONCE
Status: COMPLETED | OUTPATIENT
Start: 2025-05-02 | End: 2025-05-02

## 2025-05-02 RX ORDER — METOCLOPRAMIDE HYDROCHLORIDE 5 MG/ML
10 INJECTION INTRAMUSCULAR; INTRAVENOUS ONCE
Status: COMPLETED | OUTPATIENT
Start: 2025-05-02 | End: 2025-05-02

## 2025-05-02 RX ORDER — HYDROCODONE BITARTRATE AND ACETAMINOPHEN 7.5; 325 MG/1; MG/1
1 TABLET ORAL EVERY 4 HOURS PRN
Refills: 0 | Status: DISCONTINUED | OUTPATIENT
Start: 2025-05-02 | End: 2025-05-02

## 2025-05-02 RX ORDER — METOCLOPRAMIDE HYDROCHLORIDE 5 MG/ML
10 INJECTION INTRAMUSCULAR; INTRAVENOUS EVERY 8 HOURS PRN
Status: DISCONTINUED | OUTPATIENT
Start: 2025-05-02 | End: 2025-05-02

## 2025-05-02 RX ORDER — HYDROMORPHONE HYDROCHLORIDE 1 MG/ML
0.4 INJECTION, SOLUTION INTRAMUSCULAR; INTRAVENOUS; SUBCUTANEOUS EVERY 5 MIN PRN
Status: DISCONTINUED | OUTPATIENT
Start: 2025-05-02 | End: 2025-05-02

## 2025-05-02 RX ORDER — MIDAZOLAM HYDROCHLORIDE 1 MG/ML
INJECTION INTRAMUSCULAR; INTRAVENOUS AS NEEDED
Status: DISCONTINUED | OUTPATIENT
Start: 2025-05-02 | End: 2025-05-02 | Stop reason: SURG

## 2025-05-02 RX ORDER — ONDANSETRON 2 MG/ML
4 INJECTION INTRAMUSCULAR; INTRAVENOUS EVERY 6 HOURS PRN
Status: DISCONTINUED | OUTPATIENT
Start: 2025-05-02 | End: 2025-05-02

## 2025-05-02 RX ORDER — KETOROLAC TROMETHAMINE 30 MG/ML
INJECTION, SOLUTION INTRAMUSCULAR; INTRAVENOUS AS NEEDED
Status: DISCONTINUED | OUTPATIENT
Start: 2025-05-02 | End: 2025-05-02 | Stop reason: SURG

## 2025-05-02 RX ORDER — HYDROMORPHONE HYDROCHLORIDE 1 MG/ML
0.2 INJECTION, SOLUTION INTRAMUSCULAR; INTRAVENOUS; SUBCUTANEOUS EVERY 5 MIN PRN
Status: DISCONTINUED | OUTPATIENT
Start: 2025-05-02 | End: 2025-05-02

## 2025-05-02 RX ADMIN — GLYCOPYRROLATE 0.2 MG: 0.2 INJECTION, SOLUTION INTRAMUSCULAR; INTRAVENOUS at 10:18:00

## 2025-05-02 RX ADMIN — ONDANSETRON 4 MG: 2 INJECTION INTRAMUSCULAR; INTRAVENOUS at 10:49:00

## 2025-05-02 RX ADMIN — DEXAMETHASONE SODIUM PHOSPHATE 4 MG: 4 MG/ML VIAL (ML) INJECTION at 10:26:00

## 2025-05-02 RX ADMIN — MIDAZOLAM HYDROCHLORIDE 2 MG: 1 INJECTION INTRAMUSCULAR; INTRAVENOUS at 10:01:00

## 2025-05-02 RX ADMIN — KETOROLAC TROMETHAMINE 30 MG: 30 INJECTION, SOLUTION INTRAMUSCULAR; INTRAVENOUS at 12:31:00

## 2025-05-02 RX ADMIN — LIDOCAINE HYDROCHLORIDE 40 MG: 10 INJECTION, SOLUTION EPIDURAL; INFILTRATION; INTRACAUDAL; PERINEURAL at 10:08:00

## 2025-05-02 RX ADMIN — SODIUM CHLORIDE, SODIUM LACTATE, POTASSIUM CHLORIDE, CALCIUM CHLORIDE: 600; 310; 30; 20 INJECTION, SOLUTION INTRAVENOUS at 12:49:00

## 2025-05-02 NOTE — ANESTHESIA PREPROCEDURE EVALUATION
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]    Relevant Problems   No relevant active problems       NPO:  Last Liquid Consumption Date: 05/01/25  Last Liquid Consumption Time: 2100  Last Solid Consumption Date: 05/01/25  Last Solid Consumption Time: 2100  Last Liquid Consumption Date: 05/01/25          History Review:  Patient Active Problem List    Diagnosis Date Noted    Severe obesity (BMI 35.0-39.9) with comorbidity (HCC) 08/01/2024    Prostate cancer (HCC) 09/22/2023    Essential hypertension 09/22/2023    Acquired deformity of finger of right hand 02/20/2023    Elevated blood pressure reading 02/25/2014    Abnormal glucose 10/25/2013    Overweight 08/13/2013       Past Medical History[1]    Past Surgical History[2]    Prescriptions Prior to Admission[3]  Current Medications and Prescriptions Ordered in Epic[4]    Allergies[5]    Family History[6]  Social Hx on file[7]    Available pre-op labs reviewed.             Vital Signs:  Body mass index is 37.84 kg/m².   height is 1.829 m (6') and weight is 126.6 kg (279 lb). His oral temperature is 97.5 °F (36.4 °C). His blood pressure is 150/95 (abnormal) and his pulse is 74. His respiration is 16 and oxygen saturation is 97%.   Vitals:    04/29/25 1621 05/02/25 0823   BP:  (!) 150/95   Pulse:  74   Resp:  16   Temp:  97.5 °F (36.4 °C)   TempSrc:  Oral   SpO2:  97%   Weight: 124.7 kg (275 lb) 126.6 kg (279 lb)   Height: 1.829 m (6')         Anesthesia Evaluation      Airway   Mallampati: I  TM distance: >3 FB  Neck ROM: full  Dental - Dentition appears grossly intact         Pulmonary - normal exam   (-) asthma  Cardiovascular - normal exam  Exercise tolerance: good  (+) hypertension    Neuro/Psych       GI/Hepatic/Renal      Endo/Other    Abdominal   (-) obese                 Anesthesia Plan:   ASA:  2  Plan:   General  Airway:  LMA  Plan Comments:     Informed Consent Plan and Risks Discussed With:  Patient  Consent Comment: I have discussed the anesthetic plan, major risks and alternatives with the patient and answered all questions.  Minor and major side effects were discussed with patient, including but not limited to: injury to teeth/gums/lips, aspiration, nausea/vomiting postoperatively, anaphylaxis, heart attack, stroke, post-operative ventilation and death. The patient desires to proceed with surgery and anesthesia as planned. All questions answered.    Discussed plan with:  CRNA      I have informed Terell Urbano and/or legal guardian or family member of the nature of the anesthetic plan, benefits, risks including possible dental damage if relevant, major complications, and any alternative forms of anesthetic management.   All of the patient's questions were answered to the best of my ability. The patient desires the anesthetic management as planned.  Aime Prajapati DO  5/2/2025 8:54 AM  Present on Admission:  **None**           [1]   Past Medical History:   Cancer (HCC)    prostate    Cellulitis    as per NG    Hemorrhoids    as per NG    High blood pressure    Lipid screening    as per NG    Migraine    as per NG    Visual impairment    glasses   [2]   Past Surgical History:  Procedure Laterality Date    Electrocardiogram, complete  09-    Scanned to Media Tab - 09-    Hand/finger surgery unlisted      as per NG    Laparoscopy, surgical prostatectomy, retropubic radical, w/nerve sparing  09/22/2023    RALP + PLND. Dr. Duran     biopsy prostate uronav (qcf=[id number])  07/14/2023   [3]   Medications Prior to Admission   Medication Sig Dispense Refill Last Dose/Taking    aspirin 81 MG Oral Tab EC Take 1 tablet (81 mg total) by mouth in the morning. LAST DOSE TAKE 4/29/2025.  HOLDING FROM 4/30/2025 UNTIL AFTER SURGERY.   4/29/2025    AMLODIPINE 5 MG Oral Tab TAKE 1 TABLET (5 MG TOTAL) BY MOUTH DAILY. 30 tablet 11 5/1/2025 at  7:00 AM    rosuvastatin (CRESTOR) 10 MG Oral Tab Take 1 tablet (10 mg total) by mouth nightly. (Patient taking differently: Take 1 tablet (10 mg total) by mouth nightly. PT WILL NOT TAKE UNTIL EVENING OF SURGERY.) 30 tablet 11 5/1/2025 at  9:00 PM    HYDROcodone-acetaminophen 7.5-325 MG Oral Tab Take 0.5-1 tablets by mouth every 4 to 6 hours as needed for Pain. (Patient taking differently: Take 0.5-1 tablets by mouth every 4 to 6 hours as needed for Pain. MEDICATION IS FOR AFTER SURGERY.) 25 tablet 0     Sildenafil Citrate 100 MG Oral Tab Take 0.5-1 tablets ( mg total) by mouth daily as needed for Erectile Dysfunction. (Patient taking differently: Take 0.5-1 tablets ( mg total) by mouth daily as needed for Erectile Dysfunction. PT HAS NOT HAD PRESCRIPTION FILLED, DOES NOT PLAN TO BEFORE SURGERY.) 10 tablet 2 Unknown   [4]   Current Facility-Administered Medications Ordered in Epic   Medication Dose Route Frequency Provider Last Rate Last Admin    lactated ringers infusion   Intravenous Continuous Juan Manuel Nesbitt MD 20 mL/hr at 05/02/25 0848 New Bag at 05/02/25 0848    ceFAZolin (Ancef) 2g in 10mL IV syringe premix  2 g Intravenous Once Juan Manuel Nesbitt MD         No current [facility]-ordered outpatient medications on file.   [5] No Known Allergies  [6]   Family History  Problem Relation Age of Onset    Heart Disease Father         CAD, as per NG    Heart Attack Father 73        Cause of death; as per NG    Lipids Father         as per NG    Heart Disease Mother         Swelling suggestive of Congestive Heart Failure; as per NG    Pulmonary Disease Sister 62        COPD; Cause of death; as per NG   [7]   Social History  Socioeconomic History    Marital status:    Tobacco Use    Smoking status: Never    Smokeless tobacco: Never   Vaping Use     Vaping status: Never Used   Substance and Sexual Activity    Alcohol use: No    Drug use: No   Other Topics Concern    Caffeine Concern Yes     Comment: Coffee, 3 cups; as per NG    Right Handed Yes

## 2025-05-02 NOTE — ANESTHESIA PROCEDURE NOTES
Airway  Date/Time: 5/2/2025 10:09 AM  Reason: Elective    Airway not difficult    General Information and Staff   Patient location during procedure: OR  Anesthesiologist: Aime Prajapati DO  Resident/CRNA: Fauzia Hare CRNA  Performed: CRNA   Performed by: Fauzia Hare CRNA  Authorized by: Aime Prajapati DO        Indications and Patient Condition  Indications for airway management: anesthesia  Sedation level: deep      Preoxygenated: yesPatient position: sniffing    Mask difficulty assessment: 1 - vent by mask    Final Airway Details    Final airway type: supraglottic airway      Successful airway: classic  Size: 5     Number of attempts at approach: 1

## 2025-05-02 NOTE — DISCHARGE INSTRUCTIONS
HOME INSTRUCTIONS  Dr Arshad Discharge Instructions      Juan Manuel Arshad M.D.   [phone]  Plastic and Reconstructive Surgery, Hand Surgery  [address], [address]  [place] [address]     GENERAL INSTRUCTIONS:  Do not remove dressing for any reason.  Keep dressing clean and dry.  Some drainage (blood and fluid) through the dressing is expected.  Some swelling is normal.  Take medications as directed.  Do not drive.          HANDS:  Keep elevated (above heart-level) at all times.  Do not try to move fingers or hand within the dressing.  Check fingertips for circulation.  Gentle fist - 10 repetitions 4 times a day.                   YOU HAVE AN APPOINTMENT AT THE OFFICE ON _________________    PLEASE CALL THE OFFICE _____________________________________     AND MAKE AN APPOINTMENT FOR ______________________________            [facility] HOME CARE INSTRUCTIONS: POST-OP ANESTHESIA  The medication that you received for sedation or general anesthesia can last up to 24 hours. Your judgment and reflexes may be altered, even if you feel like your normal self.      We Recommend:   Do not drive any motor vehicle or bicycle   Avoid mowing the lawn, playing sports, or working with power tools/applicances (power saws, electric knives or mixers)   That you have someone stay with you on your first night home   Do not drink alcohol or take sleeping pills or tranquilizers   Do not sign legal documents within 24 hours of your procedure   If you had a nerve block for your surgery, take extra care not to put any pressure on your arm or hand for 24 hours    It is normal:  For you to have a sore throat if you had a breathing tube during surgery (while you were asleep!). The sore throat should get better within 48 hours. You can gargle with warm salt water (1/2 tsp in 4 oz warm water) or use a throat lozenge for comfort  To feel muscle aches or soreness especially in the abdomen, chest or neck. The achy  feeling should go away in the next 24 hours  To feel weak, sleepy or \"wiped out\". Your should start feeling better in the next 24 hours.   To experience mild discomforts such as sore lip or tongue, headache, cramps, gas pains or a bloated feeling in your abdomen.   To experience mild back pain or soreness for a day or two if you had spinal or epidural anesthesia.   If you had laparoscopic surgery, to feel shoulder pain or discomfort on the day of surgery.   For some patients to have nausea after surgery/anesthesia    If you feel nausea or experience vomiting:   Try to move around less.   Eat less than usual or drink only liquids until the next morning   Nausea should resolve in about 24 hours    If you have a problem when you are at home:    Call your surgeons office   Discharge Instructions: After Your Surgery  You’ve just had surgery. During surgery, you were given medicine called anesthesia to keep you relaxed and free of pain. After surgery, you may have some pain or nausea. This is common. Here are some tips for feeling better and getting well after surgery.   Going home  Your healthcare provider will show you how to take care of yourself when you go home. They'll also answer your questions. Have an adult family member or friend drive you home. For the first 24 hours after your surgery:   Don't drive or use heavy equipment.  Don't make important decisions or sign legal papers.  Take medicines as directed.  Don't drink alcohol.  Have someone stay with you, if needed. They can watch for problems and help keep you safe.  Be sure to go to all follow-up visits with your healthcare provider. And rest after your surgery for as long as your provider tells you to.   Coping with pain  If you have pain after surgery, pain medicine will help you feel better. Take it as directed, before pain becomes severe. Also, ask your healthcare provider or pharmacist about other ways to control pain. This might be with heat, ice, or  relaxation. And follow any other instructions your surgeon or nurse gives you.      Stay on schedule with your medicine.     Tips for taking pain medicine  To get the best relief possible, remember these points:   Pain medicines can upset your stomach. Taking them with a little food may help.  Most pain relievers taken by mouth need at least 20 to 30 minutes to start to work.  Don't wait till your pain becomes severe before you take your medicine. Try to time your medicine so that you can take it before starting an activity. This might be before you get dressed, go for a walk, or sit down for dinner.  Constipation is a common side effect of some pain medicines. Call your healthcare provider before taking any medicines, such as laxatives or stool softeners, to help ease constipation. Also ask if you should skip any foods. Drinking lots of fluids and eating foods, such as fruits and vegetables, that are high in fiber can also help. Remember, don't take laxatives unless your surgeon has prescribed them.  Drinking alcohol and taking pain medicine can cause dizziness and slow your breathing. It can even be deadly. Don't drink alcohol while taking pain medicine.  Pain medicine can make you react more slowly to things. Don't drive or run machinery while taking pain medicine.  Your healthcare provider may tell you to take acetaminophen to help ease your pain. Ask them how much you're supposed to take each day. Acetaminophen or other pain relievers may interact with your prescription medicines or other over-the-counter (OTC) medicines. Some prescription medicines have acetaminophen and other ingredients in them. Using both prescription and OTC acetaminophen for pain can cause you to accidentally overdose. Read the labels on your OTC medicines with care. This will help you to clearly know the list of ingredients, how much to take, and any warnings. It may also help you not take too much acetaminophen. If you have questions or  don't understand the information, ask your pharmacist or healthcare provider to explain it to you before you take the OTC medicine.   Managing nausea  Some people have an upset stomach (nausea) after surgery. This is often because of anesthesia, pain, or pain medicine, less movement of food in the stomach, or the stress of surgery. These tips will help you handle nausea and eat healthy foods as you get better. If you were on a special food plan before surgery, ask your healthcare provider if you should follow it while you get better. Check with your provider on how your eating should progress. It may depend on the surgery you had. These general tips may help:   Don't push yourself to eat. Your body will tell you when to eat and how much.  Start off with clear liquids and soup. They're easier to digest.  Next try semi-solid foods as you feel ready. These include mashed potatoes, applesauce, and gelatin.  Slowly move to solid foods. Don’t eat fatty, rich, or spicy foods at first.  Don't force yourself to have 3 large meals a day. Instead eat smaller amounts more often.  Take pain medicines with a small amount of solid food, such as crackers or toast. This helps prevent nausea.  When to call your healthcare provider  Call your healthcare provider right away if you have any of these:   You still have too much pain, or the pain gets worse, after taking the medicine. The medicine may not be strong enough. Or there may be a complication from the surgery.  You feel too sleepy, dizzy, or groggy. The medicine may be too strong.  Side effects, such as nausea or vomiting. Your healthcare provider may advise taking other medicines to treat these or may change your treatment plan..  Skin changes, such as rash, itching, or hives. This may mean you have an allergic reaction. Your provider may advise taking other medicines.  The incision looks different (for instance, part of it opens up).  Bleeding or fluid leaking from the  incision site, and you weren't told to expect that.  Fever of 100.4°F (38°C) or higher, or as directed by your healthcare provider.  Call 911  Call 911 right away if you have:   Trouble breathing  Facial swelling    If you have obstructive sleep apnea   You were given anesthesia medicine during surgery to keep you comfortable and free of pain. After surgery, you may have more apnea spells because of this medicine and other medicines you were given. The spells may last longer than normal.    At home:  Keep using the continuous positive airway pressure (CPAP) device when you sleep. Unless your healthcare provider tells you not to, use it when you sleep, day or night. CPAP is a common device used to treat obstructive sleep apnea.  Talk with your provider before taking any pain medicine, muscle relaxants, or sedatives. Your provider will tell you about the possible dangers of taking these medicines.  Contact your provider if your sleeping changes a lot even when taking medicines as directed.  [employer] last reviewed this educational content on 4/1/2024  This information is for informational purposes only. This is not intended to be a substitute for professional medical advice, diagnosis, or treatment. Always seek the advice and follow the directions from your physician or other qualified health care provider.  © 2300-7804 [employer]. All rights reserved. This information is not intended as a substitute for professional medical care. Always follow your healthcare professional's instructions.

## 2025-05-02 NOTE — BRIEF OP NOTE
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

## 2025-05-02 NOTE — INTERVAL H&P NOTE
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  I discussed with the patient and/or legal representative the potential benefits, risks and side effects of this procedure; the likelihood of the patient achieving goals; and potential problems that might occur during recuperation.  I discussed reasonable alternatives to the procedure, including risks, benefits and side effects related to the alternatives and risks related to not receiving this procedure.  We will proceed with procedure as planned.

## 2025-05-02 NOTE — OPERATIVE REPORT
[facility]    PATIENT'S NAME: JULIO MAY   ATTENDING PHYSICIAN: Juan Manuel Arshad MD   OPERATING PHYSICIAN: Juan Manuel Arshad MD   PATIENT ACCOUNT#:   [id number]    LOCATION:  Critical access hospital [address] [facility]  MEDICAL RECORD #:   [id number]       YOB: 1955  ADMISSION DATE:       05/02/2025      OPERATION DATE:  05/02/2025    OPERATIVE REPORT      PREOPERATIVE DIAGNOSIS:  Right index finger giant cell tumor.  POSTOPERATIVE DIAGNOSIS:  Right index finger giant cell tumor, pending pathology report; radial digital nerve neurodesis.  PROCEDURE:  Right index finger giant cell tumor excision, radial digital nerve neurolysis, Y-V flap soft tissue reconstruction.    INDICATIONS:  A 70-year-old male, right-hand dominant, with a 3-year history of an enlarging mass of the right index finger.  He is admitted to the operating amphitheater for excision.    FINDINGS:  A 4 x 3 x 2 multilobulated firm mass which has the gross appearance of a giant cell tumor is present on the volar right index finger extending from the proximal interphalangeal joint distally.  It courses radially and is palpable dorsally.  Dimensions are 4 x 3 x 2 cm.  The ulnar digital neurovascular bundle is adherent to the ulnar aspect of the mass and is distorted ulnarly and dorsally.  The radial digital neurovascular bundle is tented volarly and radially and encased in tumor, necessitating a painstaking dissection to preserve the neurovascular bundle.  The mass is adherent to the flexor sheath, courses radially and dorsally along the distal phalanx to the dorsum of the digit, and overlies the radial aspect of the terminal slip and is also deep to the terminal slip.  Overlying skin is exceedingly thin and attenuated and requires excision.    OPERATIVE TECHNIQUE:  Patient was placed under general anesthesia.  Hand and forearm were prepped and draped in the usual sterile fashion.  A pneumatic tourniquet was inflated to 250  mmHg.    A modified Brunner incision was made.  Skin flaps were elevated.  We dissected the mass ulnarly, identified the ulnar neurovascular bundle, dissected it off the mass, and carefully preserved it.  We identified the radial digital neurovascular bundle proximally, and findings were as described above.  The dissection of the neurovascular bundle was exceedingly difficult, more so than one usually sees with adherence to giant cell tumors.  Loupe magnification as well as microinstruments were used to preserve the neurovascular bundle as we dissected it and carefully retracted it away from the operative field.  We then dissected radially and dorsally as far as could be reached from the volar incision.    A zigzag incision was made on the dorsal radial aspect of the digit.  Skin flaps were elevated.  The mass was dissected from the flaps, then off the terminal slip and resected the deep aspect deep to the terminal slip.  The dissection was then carried volarly, and the mass was excised en bloc.      The resection margins over the bone, tendon sheath, and skin were all electrocauterized.    Thinned attenuated skin was resected volarly.  We closed the dorsal incision.    The soft tissue defect volarly was reconstructed with Y-V interdigitating flaps, which were incised and then advanced radially and ulnarly to fill the soft tissue defect without tension.  Four wick drains were placed.  A bulky soft dressing was placed.     The tourniquet was released.  Total tourniquet time 117 minutes.     The patient tolerated the procedure well and left the operating suite in satisfactory condition.     Dictated By Juan Manuel Arshad MD  d: 05/02/2025 13:30:37  t: 05/02/2025 14:06:43  [facility] [id number]  [facility]/    cc: MD Kleber Garcia MD

## 2025-05-02 NOTE — ANESTHESIA POSTPROCEDURE EVALUATION
Patient: Terell Urbano    Procedure Summary       Date: 05/02/25 Room / Location: 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    [facility] AN Post Evaluation:   Patient Evaluated in PACU  Patient Participation: complete - patient participated  Level of Consciousness: awake and awake and alert  Pain Score: 0  Pain Management: adequate  Airway Patency:patent  Dental exam unchanged from preop  Yes    Cardiovascular Status: acceptable  Respiratory Status: acceptable  Postoperative Hydration acceptable      Fauzia Hare CRNA  5/2/2025 1:32 PM

## 2025-05-03 ENCOUNTER — TELEPHONE (OUTPATIENT)
Dept: SURGERY | Facility: CLINIC | Age: 70
End: 2025-05-03

## 2025-05-03 NOTE — TELEPHONE ENCOUNTER
Left message for post-operative patient to please call the office with any questions and/or concerns. Reminded patient of next RN appointment on 5/14, and MD appointment on 5/29. Additionally, per MD, pt needs an RN and OT appointment on 5/7.   Dr. Arshad notified.

## 2025-05-05 ENCOUNTER — TELEPHONE (OUTPATIENT)
Dept: SURGERY | Facility: CLINIC | Age: 70
End: 2025-05-05

## 2025-05-05 NOTE — TELEPHONE ENCOUNTER
Spoke w/ patient.  Patient told per Dr. Arshad pathology results from surgery,  is a benign giant cell tumor, as expected.   Patient Verbalized understanding.  Patient Instructed to call the office with any questions and/or concerns.  Dr. Arshad notified.

## 2025-05-05 NOTE — TELEPHONE ENCOUNTER
Spoke with pt doing well, no complaints.   Appointment scheduled per Dr. Arshad 5/7 RN/OT at 12pm. Dr. Arshad notified.

## 2025-05-07 ENCOUNTER — APPOINTMENT (OUTPATIENT)
Dept: SURGERY | Facility: CLINIC | Age: 70
End: 2025-05-07

## 2025-05-07 ENCOUNTER — NURSE ONLY (OUTPATIENT)
Dept: SURGERY | Facility: CLINIC | Age: 70
End: 2025-05-07

## 2025-05-07 DIAGNOSIS — Z48.03 CHANGE OR REMOVAL OF DRAINS: Primary | ICD-10-CM

## 2025-05-07 DIAGNOSIS — M12.241 PIGMENTED VILLONODULAR SYNOVITIS OF HAND, RIGHT: ICD-10-CM

## 2025-05-07 DIAGNOSIS — M62.81 DISTAL MUSCLE WEAKNESS: Primary | ICD-10-CM

## 2025-05-07 DIAGNOSIS — S61.200A UNSPECIFIED OPEN WOUND OF RIGHT INDEX FINGER WITHOUT DAMAGE TO NAIL, INITIAL ENCOUNTER: ICD-10-CM

## 2025-05-07 DIAGNOSIS — M25.641 JOINT STIFFNESS OF HAND, RIGHT: ICD-10-CM

## 2025-05-07 PROCEDURE — 1159F MED LIST DOCD IN RCRD: CPT | Performed by: OCCUPATIONAL THERAPIST

## 2025-05-07 PROCEDURE — 97110 THERAPEUTIC EXERCISES: CPT | Performed by: OCCUPATIONAL THERAPIST

## 2025-05-07 PROCEDURE — 97165 OT EVAL LOW COMPLEX 30 MIN: CPT | Performed by: OCCUPATIONAL THERAPIST

## 2025-05-07 PROCEDURE — 1125F AMNT PAIN NOTED PAIN PRSNT: CPT | Performed by: PLASTIC SURGERY

## 2025-05-07 PROCEDURE — 29130 APPL FINGER SPLINT STATIC: CPT | Performed by: OCCUPATIONAL THERAPIST

## 2025-05-07 NOTE — PROGRESS NOTES
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  Patient identified w/2 identifiers and orders verified.  Pt presents w/ R arm elevated in sling.  Pt assisted to exam chair and exam chair reclined for drain removal.  Patient has some intermittent pain, rates 5/10 is taking Tylenol during daytime and Norco before bed, is effective.  R hand surgical dressings C/D/I, removed.  Moderate amount of dried blood on dressings.  Sutures C/D/I, 4 wicking drains intact, no s/s infected noted. Moderate edema noted to RIF.  Pt washed hand at sink w/ soap and water.  Patient reminded of follow-up appointment on RN/OT on 5/14 and OT/MD 5/29.  Patient instructed to call the office with any questions and/or concerns.  Patient verbalized an understanding.  Escorted pt to OT appt.  Dr. Arshad notified.    RIF drain removal (4 drains)  To OT  Next RN/OT 5/29, OT/MD 5/29

## 2025-05-08 NOTE — PROGRESS NOTES
OCCUPATIONAL THERAPY EVALUATION:   Terell Urbano   [id number]       SUBJECTIVE:    HX of Injury: RIF GC Tumor  Chief Complaint:  RIF discomfort.    Precautions:  No work with the right hand.  Premorbid Functional Status: Independent w/ Occ. duties, Independent w/ driving / sitting, Independent w/ ADL's  Current Level of Function: No work with the right hand.  Employment: Temporary leave  Hand Dominance: right  Living Situation: w/ spouse  Barriers to Learning: None  Patient Goals: Full use of the right hand.    Imaging/Tests: N/A        OBJECTIVE DATA:   PAIN:   Rating (1/10): 1/10 at rest, 3/10 with activity    Location: RIF    OBSERVATION:   Guarding movements    ORTHOTICS:    RIF extension splint    SCAR/INCISION:  Sutures intact, clean and dry, no SOI:    SENSORY:  Intact      AROM/PROM:  (Degrees)  RIGHT HAND:    Thumb IF MF RF SF   MP Not assessed.       PIP        DIP        HEALY                  STRENGTH: (lbs) Right Average Left Average   : NT NT   2 pt Pinch:     3 pt Pinch:     Lateral Pinch:         ASSESSMENT & PLAN OF CARE:    Treatment Provided: Patient was seen for an initial evaluation, hand washing and dressing change, polysporin gauze and spandage:  HEP:  AROM, Tendon glides, x 20 reps per set, x 5 sets daily. Reviewed hand elevation importance. Written handout was provided to reinforce today's treatment and educational session.       Rehabilitation Potential: Good    CLINICAL ASSESSMENT:    Patient/Caregiver Education Provided: Yes    Treatment Plan:  Therapeutic Exercise  Therapeutic Activities  Modalities  Scar Management  Patient/Family Education  Splinting: RIF extension splint    GOALS:  Short term goals to be reached in x 1 week:    1) Independent with HEP..    Long term goals to be reached in x 1 month:    1) Full functional use of the involved extremity for self-care, leisure and work related tasks:.        Patient will be seen 2 x /week for 3 weeks or a total of 6 visits.    Pt. was advised regarding the findings of this evaluation and agrees to the plan of care.     Wale CHANDLER, RYANNL

## 2025-05-14 ENCOUNTER — OFFICE VISIT (OUTPATIENT)
Dept: SURGERY | Facility: CLINIC | Age: 70
End: 2025-05-14

## 2025-05-14 ENCOUNTER — NURSE ONLY (OUTPATIENT)
Dept: SURGERY | Facility: CLINIC | Age: 70
End: 2025-05-14

## 2025-05-14 DIAGNOSIS — M12.241 PIGMENTED VILLONODULAR SYNOVITIS OF HAND, RIGHT: ICD-10-CM

## 2025-05-14 DIAGNOSIS — M25.641 JOINT STIFFNESS OF HAND, RIGHT: ICD-10-CM

## 2025-05-14 DIAGNOSIS — S61.200A UNSPECIFIED OPEN WOUND OF RIGHT INDEX FINGER WITHOUT DAMAGE TO NAIL, INITIAL ENCOUNTER: ICD-10-CM

## 2025-05-14 DIAGNOSIS — Z48.02 ENCOUNTER FOR REMOVAL OF SUTURES: Primary | ICD-10-CM

## 2025-05-14 DIAGNOSIS — C61 PROSTATE CANCER (HCC): ICD-10-CM

## 2025-05-14 DIAGNOSIS — M62.81 DISTAL MUSCLE WEAKNESS: Primary | ICD-10-CM

## 2025-05-14 PROCEDURE — 1125F AMNT PAIN NOTED PAIN PRSNT: CPT | Performed by: PLASTIC SURGERY

## 2025-05-14 PROCEDURE — 97110 THERAPEUTIC EXERCISES: CPT | Performed by: OCCUPATIONAL THERAPIST

## 2025-05-14 PROCEDURE — 1159F MED LIST DOCD IN RCRD: CPT | Performed by: OCCUPATIONAL THERAPIST

## 2025-05-14 NOTE — PROGRESS NOTES
Surgery 1: RIF giant cell tumor (4 drains)  - Date: 05/02/25  - Days Since: 12    Pt here for suture/staple removal to RIF dorsal and volar  Pt identified w/2 identifiers and orders verified.  Pt presents w/cling, splint, and coban C/D/I.  Pt denies c/o pain, use of analgesics, and s/s infection.  Dressing removed carefully.  Running stitch/staples C/D/I.  Incisions appears to be healing well, edges well approximated.  Running stitch/staples removed without difficulty and pt tolerated procedure well.  Site cleansed w/soap and water and Pt escorted to OT  reminded of f/u appt w/MD on 5/29.  Pt instructed to call the office w/any further questions and/or concerns.  Dr. Arshad notified.     SR volar and dorsal RIF

## 2025-05-14 NOTE — PROGRESS NOTES
Subjective: I am moving pretty well.      Objective:     Current level of performance:  ADL: Independent  Work: With restrictions x 1 handed work.  Leisure: Family    Measurements/Tests:  ROM:  Testing By: psm  MP Index Right: 80  PIP Index Right: 80  DIP Index Right: 30  Edema Index Right: 190 HEALY               Treatment Provided this day: Following suture removal by nursing, OT reviewed cold cream scar massage technique, as well as therapeutic exercise regime:  AROM:   X 20 reps per set, x 5 sets daily:    Tendon glides:  X 10 reps per set, x 8 sets daily:    PROM:  To all digits:  X 15 reps each digit per set, x 5 sets daily:  HEP:    Treatment Time: 25 minutes      Summary/Analysis of Treatment session: Progressing well with OT goals and objectives.      Plan: Full use of the right hand in x 3 weeks:        Follow up in:  05/19/2025          Wale Salmeron  OTHUYEN/ESA

## 2025-05-19 ENCOUNTER — OFFICE VISIT (OUTPATIENT)
Dept: SURGERY | Facility: CLINIC | Age: 70
End: 2025-05-19

## 2025-05-19 DIAGNOSIS — M62.81 DISTAL MUSCLE WEAKNESS: Primary | ICD-10-CM

## 2025-05-19 DIAGNOSIS — M25.641 JOINT STIFFNESS OF HAND, RIGHT: ICD-10-CM

## 2025-05-19 PROCEDURE — 97110 THERAPEUTIC EXERCISES: CPT | Performed by: OCCUPATIONAL THERAPIST

## 2025-05-19 PROCEDURE — 1159F MED LIST DOCD IN RCRD: CPT | Performed by: OCCUPATIONAL THERAPIST

## 2025-05-19 NOTE — PROGRESS NOTES
Subjective: I am doing well.      Objective:     Current level of performance:  ADL: Independent  Work: Printer  Leisure: Family    Measurements/Tests:  ROM:  Testing By: shona  MP Index Right: 90  PIP Index Right: 80  DIP Index Right: 40  Edema Index Right: 210 HEALY             Treatment Provided this day: Therapeutic exercise: AROM:   X 20 reps per set, x 5 sets daily:    Tendon glides:  X 10 reps per set, x 8 sets daily:    PROM:  To all digits:  X 15 reps each digit per set, x 5 sets daily.  Reviewed cold cream scar massage technique: HEP:    Treatment Time: 25 minutes      Summary/Analysis of Treatment session: Progressing well with OT goals and objectives.      Plan: Full use of the right hand in x 2 weeks.      Follow up in:  05/29/2025          Wale Salmeron  OTR/L

## 2025-05-22 ENCOUNTER — TELEPHONE (OUTPATIENT)
Dept: SURGERY | Facility: CLINIC | Age: 70
End: 2025-05-22

## 2025-05-22 NOTE — TELEPHONE ENCOUNTER
Patient was contacted and he was offered to be rescheduled to sooner appointment. Patient accepted and was moved to 6/16/2025 at 2:30 pm

## 2025-05-29 ENCOUNTER — OFFICE VISIT (OUTPATIENT)
Dept: SURGERY | Facility: CLINIC | Age: 70
End: 2025-05-29

## 2025-05-29 DIAGNOSIS — M25.641 JOINT STIFFNESS OF HAND, RIGHT: ICD-10-CM

## 2025-05-29 DIAGNOSIS — M62.81 DISTAL MUSCLE WEAKNESS: Primary | ICD-10-CM

## 2025-05-29 DIAGNOSIS — M12.241 PIGMENTED VILLONODULAR SYNOVITIS OF HAND, RIGHT: Primary | ICD-10-CM

## 2025-05-29 PROCEDURE — 1126F AMNT PAIN NOTED NONE PRSNT: CPT | Performed by: PLASTIC SURGERY

## 2025-05-29 PROCEDURE — 99024 POSTOP FOLLOW-UP VISIT: CPT | Performed by: PLASTIC SURGERY

## 2025-05-29 PROCEDURE — 1159F MED LIST DOCD IN RCRD: CPT | Performed by: OCCUPATIONAL THERAPIST

## 2025-05-29 PROCEDURE — 97110 THERAPEUTIC EXERCISES: CPT | Performed by: OCCUPATIONAL THERAPIST

## 2025-05-29 NOTE — PROGRESS NOTES
Subjective: I am doing well.      Objective:     Current level of performance:  ADL: Independent  Work: No restrictions  Leisure: Family    Measurements/Tests:  ROM:  Testing By: shona  MP Index Right: 90  PIP Index Right: 90  DIP Index Right: 30   Strength Right: 65 #      Strength Left: 75 #      Treatment Provided this day: Up dated RIF objective information: Reviewed HEP:  AROM:   X 20 reps per set, x 5 sets daily:    Tendon glides:  X 10 reps per set, x 8 sets daily:    PROM:  To all digits:  X 15 reps each digit per set, x 5 sets daily:  Reviewed cold cream scar massage technique.  Physician follow-up.     Treatment Time: 20 minutes      Summary/Analysis of Treatment session: No further OT needs at this time.      Plan: Discontinue OT      Follow up in:  To call with questions and or concerns.          Wale Salmeron  OTR/L

## 2025-05-29 NOTE — PROGRESS NOTES
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  No pain.    Doing extraordinarily well    Full range of motion  Scars healing well    Strength 65 versus 75    Continue Eucerin massage  Unrestricted activity    Discharged.  To call if any problems or concerns.

## 2025-06-13 ENCOUNTER — LAB ENCOUNTER (OUTPATIENT)
Dept: LAB | Age: 70
End: 2025-06-13
Attending: UROLOGY
Payer: MEDICARE

## 2025-06-13 LAB — PSA SERPL-MCNC: <0.04 NG/ML (ref ?–4)

## 2025-06-13 PROCEDURE — 84153 ASSAY OF PSA TOTAL: CPT | Performed by: UROLOGY

## 2025-06-13 PROCEDURE — 36415 COLL VENOUS BLD VENIPUNCTURE: CPT | Performed by: UROLOGY

## 2025-06-16 ENCOUNTER — OFFICE VISIT (OUTPATIENT)
Dept: SURGERY | Facility: CLINIC | Age: 70
End: 2025-06-16

## 2025-06-16 DIAGNOSIS — N52.31 ERECTILE DYSFUNCTION AFTER RADICAL PROSTATECTOMY: ICD-10-CM

## 2025-06-16 DIAGNOSIS — N39.3 MALE STRESS INCONTINENCE: ICD-10-CM

## 2025-06-16 DIAGNOSIS — C61 PROSTATE CANCER (HCC): Primary | ICD-10-CM

## 2025-06-16 PROCEDURE — 99213 OFFICE O/P EST LOW 20 MIN: CPT | Performed by: UROLOGY

## 2025-06-16 PROCEDURE — 1159F MED LIST DOCD IN RCRD: CPT | Performed by: UROLOGY

## 2025-06-16 NOTE — PROGRESS NOTES
[facility] Urology  Follow-Up Visit    HPI: Terell Urbano is a 70 year old male presents for a follow up visit. Last seen 1/23/2025. Accompanied by his wife.    INTERVAL HISTORY: Prostate cancer, status post RALP + PLND 9/23/23.    Final pathology revealing bilateral acinar prostatic adenocarcinoma, ángel score 3+4=7 with tertiary ángel pattern 5.  No EPE, SVI or LVI.  + PNI.  Negative margins.  2 lymph nodes negative. pT2 N0 R0    Initial post-prostatectomy PSA level from 10/23/2023 undetectable at <0.01 ng/mL.    PSA from 6/2025 undetectable at <0.04 ng/mL.    He is doing Kegel exercises at home.  Completed pelvic floor PT. Denies incontinence or pad use.    Voiding with a good stream.    No meaningful erections since surgery.  Previously provided with a prescription for generic Viagra however did not fill the prescription.      1. Clinically Localized Intermediate Risk Prostate Cancer (pT2 pN0 cM0 R0)  No family history of prostate cancer.     Routine prostate cancer screening performed by patient's PCP.  His PSA from 2/9/2023 was noted to be elevated to 4.65 ng/mL.  This is increased from 2.49 ng/mL in 5/2019.     Repeat PSA 4/4/2023 remained elevated at 4.3 ng/mL.     Patient denies any significant LUTS.  He denies gross hematuria or dysuria.  No sensation of incomplete bladder emptying, straining to urinate, or weak stream.     Denies any significant erectile dysfunction.     No bone pain, fevers or chills, chest pain or shortness of breath.    Prostate MRI 6/2023 revealed a PI-RADS 4 lesion in the right PZ mid gland, suspicious for clinically significant prostate cancer.     Patient underwent a UroNav fusion prostate biopsy 7/14/2023 which revealed:  - Prostate volume of TRUS: 29.5 cc.  - PSA density: 0.15 ng/mL/cc  - GG 2 (Ángel 3+4=7) Prostate AdenoCa in 3/3 cores from MANDEEP, 30% involvement, + PNI.  - GG 2 (Ángel 3+4=7) PCa in 2/12 sextant cores (RM), 25% involvement, + PNI.  - GG 1 ([place] 3+3=6)  PCa in 3/12 sextant cores (RB, RA), up to 5% involvement, + PNI.    Staging nuclear medicine bone scan and CT of the abdomen and pelvis did not reveal any evidence of metastatic disease.    9/22/23 s/p RALP + PLND    Final pathology revealing bilateral acinar prostatic adenocarcinoma, ángel score 3+4=7 with tertiary ángel pattern 5.  No EPE, SVI or LVI.  + PNI.  Negative margins.  2 lymph nodes negative. pT2 N0 R0    - 10/26/23 F/U: PSA undetectable.  1-2 saturated pads per day.  No erections.  Strong stream.  Start PFPT.    - 4/2024 F/U: PSA 0.05 ng/mL.  Did not pursue pelvic floor PT.  1-2 pads per day.  No erections.  Strong stream.    - 7/2024 follow-up.  PSA undetectable.  Completing pelvic floor PT.  1 safety pad per day.  Voiding with a strong stream.  No erections.  Would like to monitor ED.    - 1/2025 F/U: PSA undetectable.  Completed pelvic floor PT.  Denies incontinence or pad use.  Voiding with a strong stream.  No meaningful erections.  Trial of sildenafil as needed for ED.    - 6/2025 F/U: PSA undetectable.  No incontinence or pad use.  Voiding with a strong stream.  No meaningful erections.  Did not fill Viagra prescription.  Does not want to pursue further treatment of ED.        PAST MEDICAL HISTORY: Hypertension (not on medication).  Prostate cancer 7/2023     PAST SURGICAL HISTORY: Hand surgery. RALP + PLND 9/2023.     SOCIAL HISTORY:  and has 3 grownup children.  No smoking or illicit drug use.  Rare social alcohol.  Works in [employer].     Reviewed past medical, surgical, family, and social history.  Reviewed med list and allergies.      REVIEW OF SYSTEMS:  Pertinent positives and negatives per HPI. A 12-point ROS was performed and is otherwise negative.       EXAM:  There were no vitals taken for this visit.    Physical Exam  Constitutional:       General: He is not in acute distress.     Appearance: He is well-developed.   HENT:      Head: Normocephalic.   Eyes:      General: No  scleral icterus.  Cardiovascular:      Rate and Rhythm: Normal rate.   Pulmonary:      Effort: Pulmonary effort is normal. No respiratory distress.   Musculoskeletal:      Cervical back: Normal range of motion.   Skin:     General: Skin is warm and dry.   Neurological:      Mental Status: He is alert and oriented to person, place, and time.   Psychiatric:         Mood and Affect: Mood normal.         Behavior: Behavior normal.         Thought Content: Thought content normal.         Judgment: Judgment normal.       PATHOLOGY:    Authorizing Provider:  Jose Duran MD       Collected:           09/22/2023 09:20 AM           Ordering Location:     [facility]          Received:            09/22/2023 01:44 PM                                  Operating Room                                                               Pathologist:           Zoya Anderson MD                                                             Specimens:   A) - Yas prostatic fat, 1. yas prostatic fat                                                      B) - Prostate, 1. prostate, bilateral seminel vesicles, bilateral ampulla of vas                    C) - Lymph node pelvic left, 3. left pelvic lymph nodes                                              D) - Lymph node pelvic right, 4. right pelvic lymph nodes                                  Final Diagnosis:      A. Periprostatic fat; excision:   Fibroadipose tissue with vascular congestion.  No evidence of malignancy is seen.     B. Prostate, bilateral seminal vesicles and bilateral ampulla of vas; robotic assisted radical prostatectomy:   Bilateral acinar prostatic adenocarcinoma, Dave score 3+4 = 7, (grade group 2), (15% [place] 4 pattern) with territory Dave pattern 5 (less than 5%).  Dominant tumor nodule measures 2.1 cm in maximum dimension, (right posterior region).    Tumor is involving approximately 8% of the entire prostatic tissue.  No evidence of extra-prostatic tumor  extension into the periprostatic fibroadipose tissue is identified.  All inked external specimen margins are free of tumor, with tumor showing a closest approach of 0.5 mm from the inked specimen margin in the right posterior region.  Multifocal intraprostatic tumor perineural invasion present.  No evidence of tumor lymphovascular invasion identified.  Prostatic parenchyma uninvolved by tumor demonstrates glandular and stromal hyperplasia, and multifocal mild chronic prostatitis.   Bilateral seminal vesicles and vasa deferentia are negative for malignancy.  Pathologic TNM: pT2, N0 [See Prostate Cancer Case Summary].       C. Left pelvic lymph nodes; dissection:   Two benign reactive lymph nodes with fatty changes (0/2).  No evidence of metastatic carcinoma is seen.     D. Right pelvic lymph nodes; dissection:   Fibroadipose tissue with hemorrhage and vascular congestion.  No lymphoid tissue is identified.       Pathology                                Case: [id number]       Provider:  Jose Duran MD       Collected:           07/14/2023                 Location [place] Score % of Pattern 4  Grade Group   Positive Cores / Total Cores  Tumor Length (mm)  % Tissue Involved       A. Region of interest 3+4 10 2 3/3 20 30    B. Left base - -- -- -- -- -    C. Left mid - - - - - -    D. Left apex - - - - - -    E. Right base 3+3 - 1 2/2 4 5    F. Right mid 3+4 10 2 2/2 12 25    G. Right apex 3+3 - 1 1/2 3 3       LABS:      PSA   Latest Ref Rng <=4.00 ng/mL   8/19/2013 1.2    8/20/2014 1.3    3/16/2016 1.5    1/8/2018 2.6    5/23/2019 2.49    2/9/2023 4.65 (H)   4/4/2023 4.30 (H)    10/23/2023 < 0.01   4/22/2024 0.05   7/25/2024 <0.04   1/2025 <0.04   6/2025 <0.04       IMAGING:    NM BONE SCAN WB (8/2/2023): No evidence for metastatic disease to the bone.     CT ABDOMEN+PELVIS (8/2/2023): 1. No metastatic disease is identified within the abdomen or pelvis. No adenopathy is seen within the abdomen or pelvis. 2. There  is a dermal nodule within the cutaneous fat at the right paracentral back posteriorly at the level of L1. Clinical correlation with physical exam is suggested.      UROLOGY PROCEDURE:  None performed today.       IMPRESSION:  70 year old male who is s/p RALP + PLND on 9/22/23    PSA levels continue to be undetectable.    Reviewed with patient.  Results are reassuring.  No indication for any further treatment at this point.  Recommend continued surveillance with repeat PSA in 6 months.    Regarding urinary continence: Excellent recovery after completing pelvic floor PT.  Denies any significant incontinence or problems.  Recommend continuing home exercise program.    Discussed post-prostatectomy erectile dysfunction.  Management options reviewed,including oral 5 PDE inhibitors, ICI therapy, vacuum erection device, urethral gel/suppositories, penile prosthetic surgery.  We discussed benefits, risks, side effects, alternatives of treatment.    Patient does not want to pursue any further management of his ED at this time.    All questions answered.       PLAN:  1.  Continue Kegel exercises.    2.  Prostate cancer surveillance with repeat PSA in 6 months.    3.  Patient elects conservative management of ED.    RTC for follow-up in 6 months with a PSA prior to office visit.      Jose Duran MD  6/16/2025

## (undated) DEVICE — MARYLAND BIPOLAR FORCEPS: Brand: ENDOWRIST

## (undated) DEVICE — PAD POS 36IN DISP SURGYPAD

## (undated) DEVICE — PROGRASP FORCEPS: Brand: ENDOWRIST

## (undated) DEVICE — INSUFFLATION NEEDLE TO ESTABLISH PNEUMOPERITONEUM.: Brand: INSUFFLATION NEEDLE

## (undated) DEVICE — GLOVE SUR 7 SENSICARE PI MIC PIP CRM PWD F

## (undated) DEVICE — MAT TRNSF 34X78IN C1200LB NYL POLYPR FBR

## (undated) DEVICE — MONOPOLAR CURVED SCISSORS: Brand: ENDOWRIST

## (undated) DEVICE — AIRSEAL TRI-LUMEN LILTERED TUBE SET: Brand: AIRSEAL

## (undated) DEVICE — CATHETER URETH 18FR BLLN 5CC SIL ALLY W/ SIL

## (undated) DEVICE — SUTURE MCRYL SZ 4-0 L27IN ABSRB UD L19MM PS-2

## (undated) DEVICE — UNIVERSAL STAPLER: Brand: ENDO GIA ULTRA

## (undated) DEVICE — ADHESIVE SKIN TOP FOR WND CLSR DERMBND ADV

## (undated) DEVICE — CANNULA SEAL

## (undated) DEVICE — AIRSEAL 12 MM ACCESS PORT AND PALM GRIP OBTURATOR WITH BLADELESS OPTICAL TIP, 120 MM LENGTH: Brand: AIRSEAL

## (undated) DEVICE — TISSUE RETRIEVAL SYSTEM: Brand: INZII RETRIEVAL SYSTEM

## (undated) DEVICE — SOLUTION IRRIG 1000ML 0.9% NACL USP BTL

## (undated) DEVICE — STERILE SURGICAL LUBRICANT, METAL TUBE: Brand: SURGILUBE

## (undated) DEVICE — ABSORBABLE HEMOSTAT (OXIDIZED REGENERATED CELLULOSE, U.S.P.): Brand: SURGICEL

## (undated) DEVICE — PENROSE DRAIN 12" X 3/8": Brand: CARDINAL HEALTH

## (undated) DEVICE — LARGE HEM-O-LOK CLIP APPLIER: Brand: ENDOWRIST;DAVINCI SI

## (undated) DEVICE — SOLUTION IRRIG 1000ML ST H2O AQUALITE PLAS

## (undated) DEVICE — GAMMEX® PI HYBRID SIZE 8.5, STERILE POWDER-FREE SURGICAL GLOVE, POLYISOPRENE AND NEOPRENE BLEND: Brand: GAMMEX

## (undated) DEVICE — ROBOTIC: Brand: MEDLINE INDUSTRIES, INC.

## (undated) DEVICE — PLASTIC HAND: Brand: MEDLINE INDUSTRIES, INC.

## (undated) DEVICE — SUCTION CANISTER, 3000CC,SAFELINER: Brand: DEROYAL

## (undated) DEVICE — ARM DRAPE

## (undated) DEVICE — ABSORBABLE WOUND CLOSURE DEVICE: Brand: V-LOC 90

## (undated) DEVICE — DISPOSABLE TOURNIQUET CUFF SINGLE BLADDER, DUAL PORT AND QUICK CONNECT CONNECTOR: Brand: COLOR CUFF

## (undated) DEVICE — BAG DRNGE 2000ML URIN INF CTRL ANTI REFLX

## (undated) DEVICE — SKIN PREP TRAY 4 COMPARTM TRAY: Brand: MEDLINE INDUSTRIES, INC.

## (undated) DEVICE — SUTURE VCRL SZ 0 L54IN ABSRB UD POLYGLACTIN

## (undated) DEVICE — GAMMEX® PI HYBRID SIZE 7.5, STERILE POWDER-FREE SURGICAL GLOVE, POLYISOPRENE AND NEOPRENE BLEND: Brand: GAMMEX

## (undated) DEVICE — CATHETER URETHRAL 20FR BALLOON 5CC SIL HYDROGEL 2 W

## (undated) DEVICE — LAPAROVUE VISIBILITY SYSTEM LAPAROSCOPIC SOLUTIONS: Brand: LAPAROVUE

## (undated) DEVICE — SUTURE VCRL SZ 3-0 L27IN ABSRB UD L26MM SH

## (undated) DEVICE — BLADELESS OBTURATOR: Brand: WECK VISTA

## (undated) DEVICE — CATHETER URETH 22FR BLLN 5CC LTX SIL HYDRGEL

## (undated) DEVICE — ELECTRO LUBE IS A SINGLE PATIENT USE DEVICE THAT IS INTENDED TO BE USED ON ELECTROSURGICAL ELECTRODES TO REDUCE STICKING.: Brand: KEY SURGICAL ELECTRO LUBE

## (undated) DEVICE — INTENDED FOR TISSUE SEPARATION, AND OTHER PROCEDURES THAT REQUIRE A SHARP SURGICAL BLADE TO PUNCTURE OR CUT.: Brand: BARD-PARKER ® STAINLESS STEEL BLADES

## (undated) DEVICE — SUT ETHLN 4-0 18IN P-3 NABSRB BLK 13MM 3/8 CI

## (undated) DEVICE — LARGE HEM-O-LOK CLIP APPLIER: Brand: ENDOWRIST

## (undated) DEVICE — LARGE NEEDLE DRIVER: Brand: ENDOWRIST

## (undated) DEVICE — TIP COVER ACCESSORY

## (undated) DEVICE — PENCIL TELESCOPE MEGADYNE SE

## (undated) DEVICE — COLUMN DRAPE

## (undated) DEVICE — 3M™ STERI-DRAPE™ PATIENT ISOLATION DRAPE 1014: Brand: STERI-DRAPE™

## (undated) DEVICE — SUTURE VCRL SZ 0 L27IN ABSRB VLT L36MM CT-1

## (undated) DEVICE — TROCAR: Brand: KII FIOS FIRST ENTRY

## (undated) DEVICE — FENESTRATED BIPOLAR FORCEPS: Brand: ENDOWRIST

## (undated) DEVICE — SOLUTION IV 1000ML 0.9% NACL PRESERVATIVE

## (undated) DEVICE — CLIP INT L POLYMER LOK LIG HEM O LOK

## (undated) DEVICE — 2HRM17 2-0 UMND 16X16 13MM LDR: Brand: 2HRM17 2-0 UMND 16X16 13MM LDR

## (undated) DEVICE — SUTURE VCRL SZ 0 L27IN ABSRB VLT L26MM UR-6

## (undated) DEVICE — DRAPE SRG 26X15IN UTL TPE STRL

## (undated) DEVICE — 3M™ TEGADERM™ TRANSPARENT FILM DRESSING FRAME STYLE, 1626W, 4 IN X 4-3/4 IN (10 CM X 12 CM), 50/CT 4CT/CASE: Brand: 3M™ TEGADERM™

## (undated) DEVICE — GAUZE,SPONGE,FLUFF,6"X6.75",STRL,5/TRAY: Brand: MEDLINE

## (undated) DEVICE — DRAPE SHEET LAPCHOLE 124X100X7

## (undated) DEVICE — INTELLIGENT RELOAD: Brand: TRI-STAPLE 2.0

## (undated) NOTE — LETTER
Hospital Discharge Documentation  Please phone to schedule a hospital follow up appointment. From: [address] Hospitalist's Office  Phone: [phone]    Patient discharged time/date: 2023  4:51 PM  Patient discharge disposition:  Home or Self Care       Discharge Summary - D/C Summary        Discharge Summary signed by Kathia Cartwright MD at 2023 12:46 PM  Version 1 of 1      Author: Kathia Cartwright MD Service: Hospitalist Author Type: Physician    Filed: 2023 12:46 PM Date of Service: 2023 10:26 AM Status: Signed    : Kathia Cartwright MD (Physician)         [facility] HOSP [facility]    Discharge Summary    Roseline Mejia Patient Status:  Outpatient in a Bed    1955 MRN [id number]   Location [facility] 4W/SW/SE Attending Kathia Cartwright MD   Hosp Day # 0 PCP Ralston Riedel, MD     Date of Admission: 2023 Disposition: Home or Self Care     Date of Discharge: 23      Admitting Diagnosis: Prostate cancer [facility]) [place]  Prostate cancer [facility])    Hospital Discharge Diagnoses:  Prostate Cancer    Lace+ Score: 50  59-90 High Risk  29-58 Medium Risk  0-28   Low Risk. TCM Follow-Up Recommendation:  LACE 29-58:  Moderate Risk of readmission after discharge from the hospital.      Problem List: Patient Active Problem List:     Abnormal glucose     Elevated blood pressure reading     Overweight     Elevated PSA     Acquired deformity of finger of right hand     Prostate cancer ([address]     Essential hypertension      Reason for Admission:       Physical Exam:   General appearance: alert, appears stated age and cooperative  Pulmonary:  clear to auscultation bilaterally  Cardiovascular: S1, S2 normal, no murmur, click, rub or gallop, regular rate and rhythm  Abdominal: soft, non-tender; bowel sounds normal; no masses,  no organomegaly  Extremities: extremities normal, atraumatic, no cyanosis or edema  Psychiatric: calm      History of Present Illness:   Per  Renee  Patient is a pleasant 80-year-old male who was diagnosed with clinically localized intermediate risk prostate cancer upon prostate biopsy performed 7/14/2023 for evaluation of elevated screening PSA levels and a suspicious prostate MRI. Staging studies were negative for metastatic disease. Definitive local therapy options were discussed with the patient who elected to proceed with a robotic radical prostatectomy and pelvic lymph node dissection. The procedure was discussed in detail including rationale, approach, benefits, risks, possible complications, and reasonable alternatives of treatment. We discussed surgical risks including but not limited to medical and anesthetic complications, bleeding, infection, damage to surrounding organs or structures, need for additional procedures, anastomotic leak, bladder neck contracture, urinary incontinence, erectile dysfunction. He verbalized understanding and wishes to proceed. He was preoperatively medically optimized and cleared for surgery by the medical consultant. Today, he offers no new complaints. He denies fevers or chills, chest pain or shortness of breath. Hospital Course:   Prostate cancer ([address]  S/P XI-Robotic assisted laparoscopic radical prostatectomy, bilateral pelvic lymph node dissection  pain control-->norco on dc  Hemodynamic status stable  DVT prophylaxis received hep subcutaneous while hospitalized  Pathology pending. --follow-up with Dr. Asia Sneed for results  Home with desai     Essential hypertension  Cont home meds  BP stable.       Consultations:   Urology    Procedures: S/P XI-Robotic assisted laparoscopic radical prostatectomy, bilateral pelvic lymph node dissection    Complications: n/a    Discharge Condition: Good    Discharge Medications:      Discharge Medications        START taking these medications        Instructions Prescription details   HYDROcodone-acetaminophen 5-325 MG Tabs  Commonly known as: Norco      Take 1-2 tablets by mouth every 6 (six) hours as needed for Pain. Quantity: 30 tablet  Refills: 0     senna-docusate 8.6-50 MG Tabs  Commonly known as: Senokot-S      Take 2 tablets by mouth nightly as needed for constipation. Quantity: 14 tablet  Refills: 0            CONTINUE taking these medications        Instructions Prescription details   amLODIPine 5 MG Tabs  Commonly known as: Norvasc      Take 1 tablet (5 mg total) by mouth daily. Quantity: 30 tablet  Refills: 11               Where to Get Your Medications        [id number] [place] [facility] [address] AT [address] [place] [address], [phone], [address] [place] [address] [place], [facility] [id number]      Hours: 24-hours Phone: [phone]   HYDROcodone-acetaminophen 5-325 MG Tabs  senna-docusate 8.6-50 MG Tabs         Follow up Visits:  Follow-up with pcp as need     Follow up Labs: n/a      Other Discharge Instructions: follow-up with urology as instructed    Oscar García MD  9/23/2023  10:26 AM    > 35 min       Electronically signed by Yue Lawson MD on 9/23/2023 12:46 PM

## (undated) NOTE — LETTER
[facility] [place] [address]  [phone]  Authorization for Imaging Procedure    I hereby authorize Dr. Vera Hinton, my physician and his/her assistants (if applicable), which may include medical students, residents, and/or fellows, to perform the following procedure and administer such anesthesia as may be determined necessary by my physician: ULTRASOUND WITH [address] (CPT=55700/[id number]) on [facility]. 2.  I recognize that during the procedure, unforeseen conditions may necessitate additional or different procedures than those listed above. I, therefore, further authorize and request that the above-named physician, assistants, or designees perform such procedures as are, in their judgment, necessary and desirable. 3.  My physician has discussed prior to my procedure the potential benefits, risks and side effects of this procedure; the likelihood of achieving goals; and potential problems that might occur during recuperation. They also discussed reasonable alternatives to the procedure, including risks, benefits, and side effects related to the alternatives and risks related to not receiving this procedure. I have had all my questions answered and I acknowledge that no guarantee has been made as to the result that may be obtained. 4.  Should the need arise during my procedure, which includes change of level of care prior to discharge, I also consent to the administration of blood and/or blood products. Further, I understand that despite careful testing and screening of blood or blood products by collecting agencies, I may still be subject to ill effects as a result of receiving a blood transfusion and/or blood products.  The following are some, but not all, of the potential risks that can occur: fever and allergic reactions, hemolytic reactions, transmission of diseases such as Hepatitis, AIDS and Cytomegalovirus (CMV) and fluid overload. In the event that I wish to have an autologous transfusion of my own blood, or a directed donor transfusion, I will discuss this with my physician. Check only if Refusing Blood or Blood Products  I understand refusal of blood or blood products as deemed necessary by my physician may have serious consequences to my condition to include possible death. I hereby assume responsibility for my refusal and release the hospital, its personnel, and my physicians from any responsibility for the consequences of my refusal.   [  ] Patient Refuses Blood      5. I authorize the use of any specimen, organs, tissues, body parts or foreign objects that may be removed from my body during the procedure for diagnosis, research or teaching purposes and their subsequent disposal by hospital authorities. I also authorize the release of specimen test results and/or written reports to my treating physician on the hospital medical staff or other referring or consulting physicians involved in my care, at the discretion of the Pathologist or my treating physician. 6.  I consent to the photographing or videotaping of the procedures to be performed, including appropriate portions of my body for medical, scientific, or educational purposes, provided my identity is not revealed by the pictures or by descriptive texts accompanying them. If the procedure has been photographed/videotaped, the physician will obtain the original picture, image, videotape or CD. The hospital will not be responsible for storage, release or maintenance of the picture, image, tape or CD.   7.  I consent to the presence of a  or observers in the operating room as deemed necessary by my physician or their designees. 8.  I recognize that in the event my procedure results in extended X-Ray/fluoroscopy time, I may develop a skin reaction. 9.   If I have a Do Not Attempt Resuscitation (DNAR) order in place, that status will be suspended while in the operating room, procedural suite, and during the recovery period unless otherwise explicitly stated by me (or a person authorized to consent on my behalf). The performing physician or my attending physician will determine when the applicable recovery period ends for purposes of reinstating the DNAR order. 10.  I acknowledge that my physician has explained sedation/analgesia administration to me including the risk and benefits I consent to the administration of sedation/analgesia as may be necessary or desirable in the judgment of my physician. I CERTIFY THAT I HAVE READ AND FULLY UNDERSTAND THE ABOVE CONSENT FOR THE PROCEDURE. Signature of Patient: _____________________________________________________________  Responsible person in case of minor, unconscious: ____________________________________  Relationship to patient:  __________________________________________________________  Signature of Witness: _______________________________Date: _________Time: __________  Statement of Physician: My signature below affirms that prior to the time of the procedure, I have explained to the patient and/or his guardian, the risks and benefits involved in the proposed treatment and any reasonable alternative to the proposed treatment. I have also explained the risks and benefits involved in the refusal of the proposed treatment and have answered the patient's questions. If I have a significant financial interest in a co-management agreement or a significant financial interest in any product or implant, or other significant relationship used in the procedure/surgery, I have disclosed this and had a discussion with my patient.   Signature of Physician:   _________________________________Date:_____________Time:________  Patient Name: Bj Mckeon : 1955  Printed: 2023   Medical Record #: [id number]

## (undated) NOTE — LETTER
25      Patient: Terell Urbano  : 1955 Visit date: 4/3/2025    Dear Kleber,      I examined your patient in consultation today.    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  We will plan on excision.    Thank you for your kind referral. If I may answer any questions, please feel free to contact me.     Sincerely,   Juan Manuel Arshad MD     CC: No Recipients

## (undated) NOTE — MR AVS SNAPSHOT
[facility] [place]  [address] [place] [address]  [phone]               Thank you for choosing us for your health care visit with Spencer Goodpasture, MD.  We are glad to serve you and happy to provide you with this summary of your visit.   Please BP Pulse Temp Weight          150/92 mmHg 87 97.9 °F (36.6 °C) 295 lb (133.811 kg)           Current Medications      Notice  As of 4/4/2017  5:37 PM    You have not been prescribed any medications.             Will     Call the helpdesk for assistance including white bread, rice and pasta   Eat plenty of protein, keep the fat content low Sugars:  sodas and sports drinks, candies and desserts   Eat plenty of low-fat dairy products High fat meats and dairy   Choose whole grain products Foods high in sodiu